# Patient Record
Sex: FEMALE | Race: WHITE | NOT HISPANIC OR LATINO | Employment: FULL TIME | URBAN - METROPOLITAN AREA
[De-identification: names, ages, dates, MRNs, and addresses within clinical notes are randomized per-mention and may not be internally consistent; named-entity substitution may affect disease eponyms.]

---

## 2018-01-10 NOTE — PROGRESS NOTES
Assessment    1  Urinary tract infection (599 0) (N39 0)    Plan  Urinary tract infection    · Ciprofloxacin HCl - 500 MG Oral Tablet; TAKE 1 TABLET TWICE DAILY   · Drink at least 6 glasses of clear liquids a day ; Status:Complete;   Done: [id number]  08:41AM    Chief Complaint    1  Dysuria    History of Present Illness    Grayson Perdomo presents with complaints of sudden onset of constant episodes of moderate dysuria  Episodes started about 2 days ago  Associated symptoms include urgency, frequency, hematuria and suprapubic pain, but no flank pain, no fever, no chills, no nausea and no vomiting  Review of Systems    Constitutional: No fever, no chills, feels well, no tiredness, no recent weight gain or loss  Cardiovascular: no complaints of slow or fast heart rate, no chest pain, no palpitations, no leg claudication or lower extremity edema  Respiratory: no complaints of shortness of breath, no wheezing, no dyspnea on exertion, no orthopnea or PND  Gastrointestinal: no complaints of abdominal pain, no constipation, no nausea or diarrhea, no vomiting, no bloody stools  Genitourinary: as noted in HPI  Active Problems    1  Asymptomatic spider vein (448 1) (I78 1)   2  Varicose veins of leg with pain, right (454 8) (I83 811)    Past Medical History    1  History of Arthritis with psoriasis (696 0) (L40 50)   2  History of Psoriasis (696 1) (L40 9)    The active problems and past medical history were reviewed and updated today  Family History  Mother    1  Family history of psoriasis (V19 4) (Z84 0)   2  Family history of Melanoma  Father    3  Family history of Heart attack    The family history was reviewed and updated today  Social History    · Never a smoker  The social history was reviewed and updated today  Surgical History    1  History of  Section   2  History of Dilation And Curettage    The surgical history was reviewed and updated today  Current Meds   1  No Reported Medications Recorded    The medication list was reviewed and updated today  Allergies    1  No Known Drug Allergies    Observations Made  Pt in no apparent distress  Self palpation of the bladder area elicits mild tenderness  No pain to self palpation of the flanks  Daniel Cyr is under my professional care   She was evaluated by myself via video conference on 10/21/16             Signatures   Electronically signed by : PORSHA Dai ; Oct 21 2016  8:41AM EST                       (Author)

## 2018-01-15 NOTE — MISCELLANEOUS
Message  Return to work or school evisit:   Aly Singleton is under my professional care   She was evaluated by myself via video conference on 10/21/16             Signatures   Electronically signed by : PORSHA Singh ; Oct 21 2016  8:41AM EST                       (Author)

## 2018-08-08 ENCOUNTER — APPOINTMENT (EMERGENCY)
Dept: ULTRASOUND IMAGING | Facility: HOSPITAL | Age: 38
End: 2018-08-08
Payer: COMMERCIAL

## 2018-08-08 ENCOUNTER — HOSPITAL ENCOUNTER (EMERGENCY)
Facility: HOSPITAL | Age: 38
Discharge: HOME | End: 2018-08-08
Attending: EMERGENCY MEDICINE | Admitting: EMERGENCY MEDICINE
Payer: COMMERCIAL

## 2018-08-08 VITALS
RESPIRATION RATE: 18 BRPM | BODY MASS INDEX: 28.59 KG/M2 | DIASTOLIC BLOOD PRESSURE: 67 MMHG | OXYGEN SATURATION: 99 % | WEIGHT: 156.31 LBS | SYSTOLIC BLOOD PRESSURE: 117 MMHG | TEMPERATURE: 98 F | HEART RATE: 82 BPM

## 2018-08-08 DIAGNOSIS — N83.209 OVARIAN CYST: ICD-10-CM

## 2018-08-08 DIAGNOSIS — D21.9 FIBROIDS: ICD-10-CM

## 2018-08-08 DIAGNOSIS — N94.6 DYSMENORRHEA: Primary | ICD-10-CM

## 2018-08-08 LAB
ALBUMIN SERPL BCP-MCNC: 4.1 G/DL (ref 3.5–5)
ALP SERPL-CCNC: 50 U/L (ref 46–116)
ALT SERPL W P-5'-P-CCNC: 43 U/L (ref 12–78)
ANION GAP SERPL CALCULATED.3IONS-SCNC: 9 MMOL/L (ref 4–13)
AST SERPL W P-5'-P-CCNC: 27 U/L (ref 5–45)
BACTERIA UR QL AUTO: ABNORMAL /HPF
BASOPHILS # BLD AUTO: 0.05 THOUSANDS/ΜL (ref 0–0.1)
BASOPHILS NFR BLD AUTO: 0 % (ref 0–1)
BILIRUB SERPL-MCNC: 0.6 MG/DL (ref 0.2–1)
BILIRUB UR QL STRIP: NEGATIVE
BUN SERPL-MCNC: 16 MG/DL (ref 5–25)
CALCIUM SERPL-MCNC: 9 MG/DL (ref 8.3–10.1)
CHLORIDE SERPL-SCNC: 100 MMOL/L (ref 100–108)
CLARITY UR: ABNORMAL
CO2 SERPL-SCNC: 27 MMOL/L (ref 21–32)
COLOR UR: ABNORMAL
CREAT SERPL-MCNC: 0.75 MG/DL (ref 0.6–1.3)
EOSINOPHIL # BLD AUTO: 0.05 THOUSAND/ΜL (ref 0–0.61)
EOSINOPHIL NFR BLD AUTO: 0 % (ref 0–6)
ERYTHROCYTE [DISTWIDTH] IN BLOOD BY AUTOMATED COUNT: 12.8 % (ref 11.6–15.1)
EXT PREG TEST URINE: NEGATIVE
GFR SERPL CREATININE-BSD FRML MDRD: 101 ML/MIN/1.73SQ M
GLUCOSE SERPL-MCNC: 100 MG/DL (ref 65–140)
GLUCOSE UR STRIP-MCNC: NEGATIVE MG/DL
HCT VFR BLD AUTO: 38.9 % (ref 34.8–46.1)
HGB BLD-MCNC: 12.8 G/DL (ref 11.5–15.4)
HGB UR QL STRIP.AUTO: ABNORMAL
IMM GRANULOCYTES # BLD AUTO: 0.07 THOUSAND/UL (ref 0–0.2)
IMM GRANULOCYTES NFR BLD AUTO: 1 % (ref 0–2)
KETONES UR STRIP-MCNC: ABNORMAL MG/DL
LEUKOCYTE ESTERASE UR QL STRIP: ABNORMAL
LIPASE SERPL-CCNC: 179 U/L (ref 73–393)
LYMPHOCYTES # BLD AUTO: 1.28 THOUSANDS/ΜL (ref 0.6–4.47)
LYMPHOCYTES NFR BLD AUTO: 9 % (ref 14–44)
MCH RBC QN AUTO: 30.6 PG (ref 26.8–34.3)
MCHC RBC AUTO-ENTMCNC: 32.9 G/DL (ref 31.4–37.4)
MCV RBC AUTO: 93 FL (ref 82–98)
MONOCYTES # BLD AUTO: 0.79 THOUSAND/ΜL (ref 0.17–1.22)
MONOCYTES NFR BLD AUTO: 6 % (ref 4–12)
NEUTROPHILS # BLD AUTO: 11.55 THOUSANDS/ΜL (ref 1.85–7.62)
NEUTS SEG NFR BLD AUTO: 84 % (ref 43–75)
NITRITE UR QL STRIP: NEGATIVE
NON-SQ EPI CELLS URNS QL MICRO: ABNORMAL /HPF
NRBC BLD AUTO-RTO: 0 /100 WBCS
PH UR STRIP.AUTO: 6.5 [PH] (ref 4.5–8)
PLATELET # BLD AUTO: 284 THOUSANDS/UL (ref 149–390)
PMV BLD AUTO: 10.5 FL (ref 8.9–12.7)
POTASSIUM SERPL-SCNC: 3.9 MMOL/L (ref 3.5–5.3)
PROT SERPL-MCNC: 7.5 G/DL (ref 6.4–8.2)
PROT UR STRIP-MCNC: ABNORMAL MG/DL
RBC # BLD AUTO: 4.18 MILLION/UL (ref 3.81–5.12)
RBC #/AREA URNS AUTO: ABNORMAL /HPF
SODIUM SERPL-SCNC: 136 MMOL/L (ref 136–145)
SP GR UR STRIP.AUTO: 1.02 (ref 1–1.03)
UROBILINOGEN UR QL STRIP.AUTO: 0.2 E.U./DL
WBC # BLD AUTO: 13.79 THOUSAND/UL (ref 4.31–10.16)
WBC #/AREA URNS AUTO: ABNORMAL /HPF

## 2018-08-08 PROCEDURE — 76856 US EXAM PELVIC COMPLETE: CPT

## 2018-08-08 PROCEDURE — 99284 EMERGENCY DEPT VISIT MOD MDM: CPT

## 2018-08-08 PROCEDURE — 83690 ASSAY OF LIPASE: CPT | Performed by: EMERGENCY MEDICINE

## 2018-08-08 PROCEDURE — 80053 COMPREHEN METABOLIC PANEL: CPT | Performed by: EMERGENCY MEDICINE

## 2018-08-08 PROCEDURE — 96361 HYDRATE IV INFUSION ADD-ON: CPT

## 2018-08-08 PROCEDURE — 76830 TRANSVAGINAL US NON-OB: CPT

## 2018-08-08 PROCEDURE — 36415 COLL VENOUS BLD VENIPUNCTURE: CPT | Performed by: EMERGENCY MEDICINE

## 2018-08-08 PROCEDURE — 96375 TX/PRO/DX INJ NEW DRUG ADDON: CPT

## 2018-08-08 PROCEDURE — 81001 URINALYSIS AUTO W/SCOPE: CPT | Performed by: EMERGENCY MEDICINE

## 2018-08-08 PROCEDURE — 85025 COMPLETE CBC W/AUTO DIFF WBC: CPT | Performed by: EMERGENCY MEDICINE

## 2018-08-08 PROCEDURE — 96374 THER/PROPH/DIAG INJ IV PUSH: CPT

## 2018-08-08 PROCEDURE — 81025 URINE PREGNANCY TEST: CPT | Performed by: EMERGENCY MEDICINE

## 2018-08-08 RX ORDER — ONDANSETRON 4 MG/1
4 TABLET, ORALLY DISINTEGRATING ORAL EVERY 6 HOURS PRN
Qty: 10 TABLET | Refills: 0 | Status: SHIPPED | OUTPATIENT
Start: 2018-08-08 | End: 2018-10-04 | Stop reason: ALTCHOICE

## 2018-08-08 RX ORDER — NAPROXEN 500 MG/1
500 TABLET ORAL EVERY 12 HOURS PRN
Qty: 20 TABLET | Refills: 0 | Status: SHIPPED | OUTPATIENT
Start: 2018-08-08 | End: 2018-10-04 | Stop reason: ALTCHOICE

## 2018-08-08 RX ORDER — ONDANSETRON 2 MG/ML
4 INJECTION INTRAMUSCULAR; INTRAVENOUS ONCE
Status: COMPLETED | OUTPATIENT
Start: 2018-08-08 | End: 2018-08-08

## 2018-08-08 RX ORDER — KETOROLAC TROMETHAMINE 30 MG/ML
15 INJECTION, SOLUTION INTRAMUSCULAR; INTRAVENOUS ONCE
Status: COMPLETED | OUTPATIENT
Start: 2018-08-08 | End: 2018-08-08

## 2018-08-08 RX ADMIN — SODIUM CHLORIDE 1000 ML: 0.9 INJECTION, SOLUTION INTRAVENOUS at 15:20

## 2018-08-08 RX ADMIN — KETOROLAC TROMETHAMINE 15 MG: 30 INJECTION, SOLUTION INTRAMUSCULAR at 15:21

## 2018-08-08 RX ADMIN — ONDANSETRON 4 MG: 2 INJECTION INTRAMUSCULAR; INTRAVENOUS at 15:21

## 2018-08-08 NOTE — ED PROVIDER NOTES
History  Chief Complaint   Patient presents with    Abdominal Pain     Pt states that she started menstrating this morning and is now having cramping and increased pain in her lower abdomin  Pt states the pain is not her normal menstrational pain  Patient is a 26-year-old female with past medical and surgical history of psoriatic arthritis currently controlled off medication as well as 2 prior C-sections, presents to the ED complaining of severe menstrual cramps  Patient reports that she started her normal  Last night and today woke up with severe pelvic cramping that is much worse than normal menstrual cramps  She reports her  Seems to be normal and not heavier than usual and she denies any passage of blood clots  She states she usually gets mild cramps that are usually resolved with Midol however she had took Midol at around 11:00 a m  and has not had any relief  She states she has been doubled over in pain which has been causing her to feel dizzy and somewhat nauseous  Pain is all across her lower abdomen, worse on the left side  She denies any other complaints at this time and rest of review of systems is negative  No fever, chills, headache, dizziness currently, URI symptoms or cough, hemoptysis, chest pain, palpitations, dyspnea, neck or back pain, skin rash or color change, upper abdominal pain, vomiting, diarrhea, constipation, blood per rectum or melena, dysuria, change in urinary frequency, hematuria, vaginal discharge, dyspareunia, extremity weakness or paresthesia or other focal neurologic deficits  Denies any known history of fibroids, ovarian cysts or sexually transmitted infections  She is  and her  is status post vasectomy so she does not believe she could be pregnant          History provided by:  Patient   used: No    Abdominal Pain   Associated symptoms: vaginal bleeding    Associated symptoms: no chest pain, no chills, no constipation, no cough, no diarrhea, no dysuria, no fever, no hematuria, no nausea, no shortness of breath, no sore throat, no vaginal discharge and no vomiting        None       Past Medical History:   Diagnosis Date    Arthritis     Psoriasis        Past Surgical History:   Procedure Laterality Date     SECTION         History reviewed  No pertinent family history  I have reviewed and agree with the history as documented  Social History   Substance Use Topics    Smoking status: Never Smoker    Smokeless tobacco: Never Used    Alcohol use Yes      Comment: occassionally        Review of Systems   Constitutional: Negative for chills and fever  HENT: Negative for congestion, ear pain, rhinorrhea and sore throat  Eyes: Negative for pain and visual disturbance  Respiratory: Negative for cough, chest tightness, shortness of breath and wheezing  Cardiovascular: Negative for chest pain and palpitations  Gastrointestinal: Positive for abdominal pain  Negative for abdominal distention, blood in stool, constipation, diarrhea, nausea and vomiting  Genitourinary: Positive for menstrual problem, pelvic pain and vaginal bleeding  Negative for dyspareunia, dysuria, flank pain, frequency, hematuria and vaginal discharge  Musculoskeletal: Negative for back pain and neck pain  Skin: Negative for color change and rash  Allergic/Immunologic: Negative for immunocompromised state  Neurological: Negative for dizziness, syncope, weakness, light-headedness, numbness and headaches  Hematological: Negative for adenopathy  Does not bruise/bleed easily  Psychiatric/Behavioral: Negative for confusion and decreased concentration  All other systems reviewed and are negative  Physical Exam  Physical Exam   Constitutional: She is oriented to person, place, and time  She appears well-developed and well-nourished  No distress  HENT:   Head: Normocephalic and atraumatic     Mouth/Throat: Oropharynx is clear and moist  Eyes: Conjunctivae and EOM are normal  Pupils are equal, round, and reactive to light  Neck: Normal range of motion  Neck supple  No JVD present  Cardiovascular: Normal rate, regular rhythm, normal heart sounds and intact distal pulses  Exam reveals no gallop and no friction rub  No murmur heard  Pulmonary/Chest: Effort normal and breath sounds normal  No respiratory distress  She has no wheezes  She has no rales  Abdominal: Soft  Bowel sounds are normal  She exhibits no distension  There is tenderness  There is no rebound and no guarding  Diffuse lower abdominal/pelvic tenderness, more significant in the left lower quadrant  Musculoskeletal: Normal range of motion  She exhibits no edema or tenderness  Neurological: She is alert and oriented to person, place, and time  No gross motor or sensory deficits  Skin: Skin is warm and dry  No rash noted  She is not diaphoretic  No erythema  No pallor  Psychiatric: She has a normal mood and affect  Her behavior is normal    Nursing note and vitals reviewed        Vital Signs  ED Triage Vitals   Temperature Pulse Respirations Blood Pressure SpO2   08/08/18 1449 08/08/18 1449 08/08/18 1449 08/08/18 1449 08/08/18 1449   98 °F (36 7 °C) 80 18 138/80 100 %      Temp Source Heart Rate Source Patient Position - Orthostatic VS BP Location FiO2 (%)   08/08/18 1449 08/08/18 1449 08/08/18 1449 08/08/18 1449 --   Oral Monitor Lying Right arm       Pain Score       08/08/18 1521       5         Vitals:    08/08/18 1449 08/08/18 1451 08/08/18 1704   BP: 138/80  117/67   BP Location: Right arm  Right arm   Pulse: 80  82   Resp: 18  18   Temp: 98 °F (36 7 °C)     TempSrc: Oral     SpO2: 100%  99%   Weight:  70 9 kg (156 lb 4 9 oz)      Visual Acuity      ED Medications  Medications   sodium chloride 0 9 % bolus 1,000 mL (0 mL Intravenous Stopped 8/8/18 1704)   ondansetron (ZOFRAN) injection 4 mg (4 mg Intravenous Given 8/8/18 1521)   ketorolac (TORADOL) injection 15 mg (15 mg Intravenous Given 8/8/18 1521)       Diagnostic Studies  Results Reviewed     Procedure Component Value Units Date/Time    Comprehensive metabolic panel [[id number]] Collected:  08/08/18 1520    Lab Status:  Final result Specimen:  Blood from Arm, Right Updated:  08/08/18 1548     Sodium 136 mmol/L      Potassium 3 9 mmol/L      Chloride 100 mmol/L      CO2 27 mmol/L      Anion Gap 9 mmol/L      BUN 16 mg/dL      Creatinine 0 75 mg/dL      Glucose 100 mg/dL      Calcium 9 0 mg/dL      AST 27 U/L      ALT 43 U/L      Alkaline Phosphatase 50 U/L      Total Protein 7 5 g/dL      Albumin 4 1 g/dL      Total Bilirubin 0 60 mg/dL      eGFR 101 ml/min/1 73sq m     Narrative:         National Kidney Disease Education Program recommendations are as follows:  GFR calculation is accurate only with a steady state creatinine  Chronic Kidney disease less than 60 ml/min/1 73 sq  meters  Kidney failure less than 15 ml/min/1 73 sq  meters      Lipase [[id number]]  (Normal) Collected:  08/08/18 1520    Lab Status:  Final result Specimen:  Blood from Arm, Right Updated:  08/08/18 1548     Lipase 179 u/L     Urine Microscopic [[id number]]  (Abnormal) Collected:  08/08/18 1520    Lab Status:  Final result Specimen:  Urine from Urine, Clean Catch Updated:  08/08/18 1546     RBC, UA Innumerable (A) /hpf      WBC, UA       Field obscured, unable to enumerate (A)     /hpf     Epithelial Cells       Field obscured, unable to enumerate (A)     /hpf     Bacteria, UA       Field obscured, unable to enumerate (A)     /hpf    UA w Reflex to Microscopic [[id number]]  (Abnormal) Collected:  08/08/18 1520    Lab Status:  Final result Specimen:  Urine from Urine, Clean Catch Updated:  08/08/18 1542     Color, UA Red     Clarity, UA Turbid     Specific Gravity, UA 1 025     pH, UA 6 5     Leukocytes, UA Trace (A)     Nitrite, UA Negative     Protein,  (2+) (A) mg/dl      Glucose, UA Negative mg/dl      Ketones, UA 15 (1+) (A) mg/dl Urobilinogen, UA 0 2 E U /dl      Bilirubin, UA Negative     Blood, UA Large (A)    CBC and differential [[id number]]  (Abnormal) Collected:  08/08/18 1520    Lab Status:  Final result Specimen:  Blood from Arm, Right Updated:  08/08/18 1533     WBC 13 79 (H) Thousand/uL      RBC 4 18 Million/uL      Hemoglobin 12 8 g/dL      Hematocrit 38 9 %      MCV 93 fL      MCH 30 6 pg      MCHC 32 9 g/dL      RDW 12 8 %      MPV 10 5 fL      Platelets 597 Thousands/uL      nRBC 0 /100 WBCs      Neutrophils Relative 84 (H) %      Immat GRANS % 1 %      Lymphocytes Relative 9 (L) %      Monocytes Relative 6 %      Eosinophils Relative 0 %      Basophils Relative 0 %      Neutrophils Absolute 11 55 (H) Thousands/µL      Immature Grans Absolute 0 07 Thousand/uL      Lymphocytes Absolute 1 28 Thousands/µL      Monocytes Absolute 0 79 Thousand/µL      Eosinophils Absolute 0 05 Thousand/µL      Basophils Absolute 0 05 Thousands/µL     POCT pregnancy, urine [[id number]]  (Normal) Resulted:  08/08/18 1520    Lab Status:  Final result Updated:  08/08/18 1528     EXT PREG TEST UR (Ref: Negative) negative                 US pelvis complete w transvaginal   Final Result by Yaz Hernandez MD (08/08 1624)   Addendum 1 of 1 by Yaz Hernandez MD (08/08 1718)   ADDENDUM:      Repeat transvaginal exam was performed and 2 5 x 2 3 x 2 2 cm and 3 x 2 5    x 2 6 cm isoechoic masses identified in the enlarged right ovary likely    representing endometriomas or hemorrhagic cysts  Follow-up pelvic    ultrasound recommended in 6 weeks  Final          1  Enlarged right ovary likely containing a corpus luteum  However, follow-up pelvic ultrasound in 6 weeks is recommended to ensure resolution and assess progression  2   Uterine leiomyomata                        Workstation performed: [id number]                    Procedures  Procedures       Phone Contacts  ED Phone Contact    ED Course  ED Course as of Aug 08 1727   Wed Aug 08, 2018   1623 Patient reassessed and pain overall improved  1726 Updated patient about ultrasound findings as well as need for 6 week follow-up  Advised patient to follow up with her OBGYN to discuss the ultrasound findings as well as for her worsening menstrual pain and for repeat follow-up ultrasound in 6 weeks  Discussed ED return parameters  MDM  Number of Diagnoses or Management Options  Diagnosis management comments: 39-year-old female presents with vaginal bleeding and pelvic pain in conjunction with menstruation  Most likely this is dysmenorrhea/severe menstrual cramping  Differential also includes ovarian cyst, cyst rupture, ovarian torsion  Less likely PID  Low suspicion for GI or surgical pathology such as appendicitis or diverticulitis  Will evaluate with abdominal labs and pelvic ultrasound  Will give IV fluid bolus, Zofran and Toradol  Amount and/or Complexity of Data Reviewed  Clinical lab tests: ordered and reviewed  Tests in the radiology section of CPT®: ordered and reviewed  Independent visualization of images, tracings, or specimens: yes      CritCare Time    Disposition  Final diagnoses:   Dysmenorrhea   Ovarian cyst   Fibroids     Time reflects when diagnosis was documented in both MDM as applicable and the Disposition within this note     Time User Action Codes Description Comment    8/8/2018  5:24 PM Alvino LE Add [N94 6] Dysmenorrhea     8/8/2018  5:24 PM Hunt Guard Add [P88 460,  N83 202] Bilateral ovarian cysts     8/8/2018  5:24 PM Hunt Guard Remove [W67 027,  N83 202] Bilateral ovarian cysts     8/8/2018  5:24 PM Alvino Burrell E Add [Q73 417] Ovarian cyst     8/8/2018  5:24 PM Alvino LE Add [D25 9] Fibroids       ED Disposition     ED Disposition Condition Comment    Discharge  Tiburcio Ramirez discharge to home/self care      Condition at discharge: Stable        Follow-up Information     Follow up With Specialties Details Why Contact Info Additional Information    [facility], DO Family Medicine Schedule an appointment as soon as possible for a visit  [address]  [phone]       Ob/Gyn  Schedule an appointment as soon as possible for a visit For follow-up ultrasound in 6 weeks      [facility] Emergency Medicine Go to If symptoms worsen [address] [facility] [address]  [phone] [place] [facility], [address], [place], [address]          Patient's Medications   Discharge Prescriptions    NAPROXEN (NAPROSYN) 500 MG TABLET    Take 1 tablet (500 mg total) by mouth every 12 (twelve) hours as needed for mild pain or moderate pain       Start Date: 8/8/2018  End Date: --       Order Dose: 500 mg       Quantity: 20 tablet    Refills: 0    ONDANSETRON (ZOFRAN-ODT) 4 MG DISINTEGRATING TABLET    Take 1 tablet (4 mg total) by mouth every 6 (six) hours as needed for nausea or vomiting       Start Date: 8/8/2018  End Date: --       Order Dose: 4 mg       Quantity: 10 tablet    Refills: 0     No discharge procedures on file      ED Provider  Electronically Signed by           Sergio Deras DO  08/08/18 2031

## 2018-08-08 NOTE — ED NOTES
US at [facility] [address], [facility]  08/08/18 0543

## 2018-08-08 NOTE — DISCHARGE INSTRUCTIONS
Dysmenorrhea   WHAT YOU NEED TO KNOW:   Dysmenorrhea is painful menstrual cramps at or around the time of your monthly period  DISCHARGE INSTRUCTIONS:   Medicines: You may need any of the following:  · NSAIDs  help decrease swelling, pain, and fever  This medicine is available with or without a doctor's order  NSAIDs can cause stomach bleeding or kidney problems in certain people  If you take blood thinner medicine, always ask your healthcare provider if NSAIDs are safe for you  Always read the medicine label and follow directions  · Birth control medicine  may help decrease your pain  This medicine may be birth control pills or an IUD that does not contain copper  · Take your medicine as directed  Contact your healthcare provider if you think your medicine is not helping or if you have side effects  Tell him if you are allergic to any medicine  Keep a list of the medicines, vitamins, and herbs you take  Include the amounts, and when and why you take them  Bring the list or the pill bottles to follow-up visits  Carry your medicine list with you in case of an emergency  Eat low-fat foods: Increase the amount of vegetables and raw seeds you eat  Ask your healthcare provider if you should take vitamin B or magnesium supplements  These will help decrease your pain  Do not eat dairy products or eggs  Apply heat:  Apply heat on your lower abdomen for 20 to 30 minutes every 2 hours for as many days as directed  Heat helps decrease pain and muscle spasms  Manage your stress:  Stress can make your symptoms worse  Try relaxation exercises, such as deep breathing  Exercise regularly:  Ask your healthcare provider about the best exercise plan for you  Exercise can help decrease pain  Keep a record of your pain:  Write down when your pain and periods start and stop  Bring the record with you to your follow-up visits  Do not smoke:  Avoid others who smoke  If you smoke, it is never too late to quit   Smoking can increase your risk for dysmenorrhea  Ask your healthcare provider for information if you need help quitting  Follow up with your healthcare provider or OB-GYN as directed:  Write down your questions so you remember to ask them during your visits  Contact your healthcare provider or OB-GYN if:   · You have anxiety or feel depressed  · Your periods are early, late, or more painful than usual     · You have questions or concerns about your condition or care  Return to the emergency department if:   · You have severe pain  · You have heavy vaginal bleeding and you feel faint  · You have sudden chest pain and trouble breathing  © 2017 [address]  Information is for End User's use only and may not be sold, redistributed or otherwise used for commercial purposes  All illustrations and images included in CareNotes® are the copyrighted property of A D A M , Inc  or Daren Archuleta  The above information is an  only  It is not intended as medical advice for individual conditions or treatments  Talk to your doctor, nurse or pharmacist before following any medical regimen to see if it is safe and effective for you  Ovarian Cyst   WHAT YOU NEED TO KNOW:   An ovarian cyst is a sac that grows on an ovary  This sac usually contains fluid, but may sometimes have blood or tissue in it  Most ovarian cysts are harmless and go away without treatment in a few months  Some cysts can grow large, cause pain, or break open  DISCHARGE INSTRUCTIONS:   Call 911 for any of the following:   · You are too weak or dizzy to stand up  Seek care immediately if:   · You have severe abdominal pain  The pain may be sharp and sudden  · You have a fever  Contact your healthcare provider if:   · Your periods are early, late, or more painful than usual     · You have bleeding from your vagina that is not your period  · You have abdominal pain all the time      · Your abdomen is swollen  · You have feelings of fullness, pressure, or discomfort in your abdomen  · You have trouble urinating or emptying your bladder completely  · You have pain during sex  · You are losing weight without trying  · You have questions or concerns about your condition or care  Medicines: You may need any of the following:  · NSAIDs , such as ibuprofen, help decrease swelling, pain, and fever  This medicine is available with or without a doctor's order  NSAIDs can cause stomach bleeding or kidney problems in certain people  If you take blood thinner medicine, always ask if NSAIDs are safe for you  Always read the medicine label and follow directions  Do not give these medicines to children under 10months of age without direction from your child's healthcare provider  · Birth control pills  may help to control your periods, prevent cysts, or cause them to shrink  · Take your medicine as directed  Contact your healthcare provider if you think your medicine is not helping or if you have side effects  Tell him or her if you are allergic to any medicine  Keep a list of the medicines, vitamins, and herbs you take  Include the amounts, and when and why you take them  Bring the list or the pill bottles to follow-up visits  Carry your medicine list with you in case of an emergency  Follow up with your healthcare provider as directed:  Write down your questions so you remember to ask them during your visits  Apply heat to decrease pain and cramping:  Sit in a warm bath, or place a heating pad (turned on low) or a hot water bottle on your abdomen  Do this for 15 to 20 minutes every hour for as many days as directed  © 2017 [address] [employer]  Information is for End User's use only and may not be sold, redistributed or otherwise used for commercial purposes  All illustrations and images included in CareNotes® are the copyrighted property of [email or web address]  or Daren Archuleta    The above information is an  only  It is not intended as medical advice for individual conditions or treatments  Talk to your doctor, nurse or pharmacist before following any medical regimen to see if it is safe and effective for you  Uterine Fibroids   WHAT YOU NEED TO KNOW:   Uterine fibroids are growths found inside your uterus (womb)  Uterine fibroids also may be called tumors (lumps) or leiomyomas  Uterine fibroids often appear in groups, or you may have only one  They can be small or large, and they can grow in size  They are almost always benign (not cancer) and likely will not spread to other parts of your body  DISCHARGE INSTRUCTIONS:   Medicines:   · Pain medicine: You may be given medicine to take away or decrease pain  Do not wait until the pain is severe before you take your medicine  · Hormone medicine: This medicine changes the level of certain hormones and may then help shrink your fibroids  · Contraceptives: These medicines help prevent pregnancy  They also may help shrink your fibroids  · Take your medicine as directed:  Call your healthcare provider if you think your medicine is not helping or if you have side effects  Tell him if you are taking any vitamins, herbs, or other medicines  Keep a list of the medicines you take  Include the amounts, and when and why you take them  Bring the list or the pill bottles to follow-up visits  Follow up with your healthcare provider as directed:  Write down your questions so you remember to ask them during your visits  Avoid heavy lifting: You will need to avoid lifting heavy objects for a period of time after surgery  This helps prevent injury to your surgery wound  Ask your healthcare provider when you may return to your normal daily activities  Avoid pregnancy:  You will need to avoid pregnancy until you have healed after treatment for your fibroids  Ask your healthcare provider when it is safe to become pregnant    Care for your wound:  Ask your healthcare provider how to care for your surgery wound  Contact your healthcare provider if:   · You feel weak and are more tired than usual      · You do not feel like your bladder is empty after you urinate  You also may urinate small amounts more often  · You have new or worse hot flashes  · You have any questions about your condition or care  Seek immediate care or call 911 if:   · Your heart begins to race, and you feel faint  · You have increased vaginal bleeding, pelvic pain, or pelvic pressure  · You have a fever  · Your leg feels warm, tender, and painful  It may look swollen and red  · You cough up blood  · You feel lightheaded, short of breath, and have chest pain  You cough up blood  · You feel lightheaded, short of breath, and have chest pain  © 2017 [address] [employer]  Information is for End User's use only and may not be sold, redistributed or otherwise used for commercial purposes  All illustrations and images included in CareNotes® are the copyrighted property of A D A M , Inc  or [employer]  The above information is an  only  It is not intended as medical advice for individual conditions or treatments  Talk to your doctor, nurse or pharmacist before following any medical regimen to see if it is safe and effective for you  ADDENDUM:     Repeat transvaginal exam was performed and 2 5 x 2 3 x 2 2 cm and 3 x 2 5 x 2 6 cm isoechoic masses identified in the enlarged right ovary likely representing endometriomas or hemorrhagic cysts  Follow-up pelvic ultrasound recommended in 6 weeks  Signed by Pili Wang MD on 8/8/2018  5:18 PM   Narrative     PELVIC ULTRASOUND, COMPLETE    INDICATION:  44 years old  [place]  acute severe pelvic pain, worse then menstrual cramps, r/o ovarian cyst rupture or torsion      COMPARISON: None    TECHNIQUE:   Transabdominal pelvic ultrasound was performed in sagittal and transverse planes with a curvilinear transducer   Additional transvaginal imaging was performed to better evaluate the endometrium and ovaries   Imaging included volumetric   sweeps as well as traditional still imaging technique  FINDINGS:    UTERUS:  The uterus is anteverted in position, measuring 8 8 x 3 7 cm  Posterior mid uterine intramural 0 7 cm mass/fibroid   Right-sided subserosal uterine 1 8 x 1 6 cm mass/fibroid   Left-sided uterine subserosal 1 x 1 5 cm mass/fibroid  Heterogeneous uterine echotexture  The cervix shows no suspicious abnormality  ENDOMETRIUM:    Normal caliber of 6 mm  Homogenous and normal in appearance  OVARIES/ADNEXA:  Right ovary is enlarged, 44 mL  Suspect right ovarian corpus luteum  Doppler flow within normal limits  Left ovary is normal size, 9 mL  No suspicious left ovarian abnormality  Doppler flow within normal limits  No suspicious adnexal mass or loculated collections  There is small amount of simple free fluid in the pelvis, likely physiologic in this premenopausal female  Impression          1   Enlarged right ovary likely containing a corpus luteum   However, follow-up pelvic ultrasound in 6 weeks is recommended to ensure resolution and assess progression  2   Uterine leiomyomata

## 2018-09-10 ENCOUNTER — OFFICE VISIT (OUTPATIENT)
Dept: OBGYN CLINIC | Age: 38
End: 2018-09-10
Payer: COMMERCIAL

## 2018-09-10 VITALS
HEIGHT: 62 IN | RESPIRATION RATE: 14 BRPM | WEIGHT: 155 LBS | DIASTOLIC BLOOD PRESSURE: 80 MMHG | BODY MASS INDEX: 28.52 KG/M2 | SYSTOLIC BLOOD PRESSURE: 114 MMHG

## 2018-09-10 DIAGNOSIS — N83.201 RIGHT OVARIAN CYST: ICD-10-CM

## 2018-09-10 DIAGNOSIS — N94.6 DYSMENORRHEA: Primary | ICD-10-CM

## 2018-09-10 DIAGNOSIS — Z12.31 ENCOUNTER FOR SCREENING MAMMOGRAM FOR MALIGNANT NEOPLASM OF BREAST: ICD-10-CM

## 2018-09-10 PROBLEM — D25.2 INTRAMURAL AND SUBSEROUS LEIOMYOMA OF UTERUS: Status: ACTIVE | Noted: 2018-09-10

## 2018-09-10 PROBLEM — D25.1 INTRAMURAL AND SUBSEROUS LEIOMYOMA OF UTERUS: Status: ACTIVE | Noted: 2018-09-10

## 2018-09-10 PROCEDURE — 99202 OFFICE O/P NEW SF 15 MIN: CPT | Performed by: OBSTETRICS & GYNECOLOGY

## 2018-09-10 RX ORDER — LEVONORGESTREL AND ETHINYL ESTRADIOL 0.1-0.02MG
1 KIT ORAL DAILY
Qty: 84 TABLET | Refills: 3 | Status: SHIPPED | OUTPATIENT
Start: 2018-09-10 | End: 2019-04-02 | Stop reason: ALTCHOICE

## 2018-09-10 NOTE — PROGRESS NOTES
Assessment/Plan:    Dysmenorrhea  Worsening dysmenorrhea over the last 6 months  Unclear cause- ovarian cyst rupture/endometriosis  Options- observation, oc, depoprovera, surgery    At this time will start OC for treatment of possible causes of dysmenorrhea  Follow up 3 months pill check and annual        Diagnoses and all orders for this visit:    Encounter for screening mammogram for malignant neoplasm of breast  -     Mammo screening bilateral w cad; Future    Right ovarian cyst  -     US pelvis complete w transvaginal; Future  -     levonorgestrel-ethinyl estradiol (AVIANE,ALESSE,LESSINA) 0 1-20 MG-MCG per tablet; Take 1 tablet by mouth daily    Dysmenorrhea  -     levonorgestrel-ethinyl estradiol (AVIANE,ALESSE,LESSINA) 0 1-20 MG-MCG per tablet; Take 1 tablet by mouth daily          Subjective:      Patient ID: Kerrie Fabian is a 43 y o  female  Patient is here for a follow up appointment   Patient was seen in the ER for Ovarian cyst Fibroids  43year old  with visit to ER one month ago for severe pelvic pain  Lasted approximately 24 hours  Felt weak, nauseated  Went to ER  Prior to this she had a similar episode but not as severe three months ago lasting 2-4 hours  Both episodes occurred a few days before the onset of menses  Menses occur regularly and last 5-7 days  With minimal pain  (maybe a midol)    Sexually active,  has vasectomy    Sons - 13, 6   Via csection  Has seen GYN in the last 1-2 years      In ER - ultrasound reviewed  Small fibroids/enlarged ovary    Gynecologic Exam   The patient's primary symptoms include pelvic pain  The patient's pertinent negatives include no genital itching, genital lesions, genital odor, genital rash, missed menses, vaginal bleeding or vaginal discharge  The current episode started more than 1 month ago  The problem occurs intermittently  The problem has been gradually worsening  The pain is severe  The problem affects both sides   She is not pregnant  Associated symptoms include anorexia and nausea  Pertinent negatives include no abdominal pain, chills, constipation, diarrhea, fever, painful intercourse, sore throat, urgency or vomiting  The symptoms are aggravated by activity  She has tried NSAIDs and heating pads for the symptoms  The treatment provided no relief  She is sexually active  She uses vasectomy for contraception  Her menstrual history has been regular  Her past medical history is significant for a  section  The following portions of the patient's history were reviewed and updated as appropriate:   She  has a past medical history of Arthritis and Psoriasis  She   Patient Active Problem List    Diagnosis Date Noted    Intramural and subserous leiomyoma of uterus 09/10/2018    Right ovarian cyst 09/10/2018    Dysmenorrhea 09/10/2018     She  has a past surgical history that includes  section  Her family history is not on file  She  reports that she has never smoked  She has never used smokeless tobacco  She reports that she drinks alcohol  She reports that she does not use drugs  Current Outpatient Prescriptions   Medication Sig Dispense Refill    levonorgestrel-ethinyl estradiol (AVIANE,ALESSE,LESSINA) 0 1-20 MG-MCG per tablet Take 1 tablet by mouth daily 84 tablet 3    naproxen (NAPROSYN) 500 mg tablet Take 1 tablet (500 mg total) by mouth every 12 (twelve) hours as needed for mild pain or moderate pain (Patient not taking: Reported on 9/10/2018 ) 20 tablet 0    ondansetron (ZOFRAN-ODT) 4 mg disintegrating tablet Take 1 tablet (4 mg total) by mouth every 6 (six) hours as needed for nausea or vomiting (Patient not taking: Reported on 9/10/2018 ) 10 tablet 0     No current facility-administered medications for this visit        Current Outpatient Prescriptions on File Prior to Visit   Medication Sig    naproxen (NAPROSYN) 500 mg tablet Take 1 tablet (500 mg total) by mouth every 12 (twelve) hours as needed for mild pain or moderate pain (Patient not taking: Reported on 9/10/2018 )    ondansetron (ZOFRAN-ODT) 4 mg disintegrating tablet Take 1 tablet (4 mg total) by mouth every 6 (six) hours as needed for nausea or vomiting (Patient not taking: Reported on 9/10/2018 )     No current facility-administered medications on file prior to visit  She has No Known Allergies       Review of Systems   Constitutional: Negative for activity change, appetite change, chills, fatigue and fever  HENT: Negative for rhinorrhea, sneezing and sore throat  Eyes: Negative for visual disturbance  Respiratory: Negative for cough, shortness of breath and wheezing  Cardiovascular: Negative for chest pain, palpitations and leg swelling  Gastrointestinal: Positive for anorexia and nausea  Negative for abdominal distention, abdominal pain, constipation, diarrhea and vomiting  Genitourinary: Positive for pelvic pain  Negative for difficulty urinating, missed menses, urgency and vaginal discharge  Neurological: Negative for syncope and light-headedness  Objective:      /80 (BP Location: Left arm, Patient Position: Sitting, Cuff Size: Standard)   Resp 14   Ht 5' 2" (1 575 m)   Wt 70 3 kg (155 lb)   LMP 09/05/2018 (Exact Date)   BMI 28 35 kg/m²          Physical Exam   Abdominal: Soft  Bowel sounds are normal  She exhibits no distension and no mass  There is no tenderness  There is no rebound and no guarding  Genitourinary: There is no rash, tenderness, lesion or injury on the right labia  There is no rash, tenderness or lesion on the left labia  Uterus is not deviated, not enlarged, not fixed and not tender  Cervix exhibits no motion tenderness, no discharge and no friability  Right adnexum displays no mass, no tenderness and no fullness  Left adnexum displays no mass, no tenderness and no fullness  No erythema or tenderness in the vagina  No signs of injury around the vagina  No vaginal discharge found  Ultrasound: reviewed personally by me  FINDINGS:     UTERUS:  The uterus is anteverted in position, measuring 8 8 x 3 7 cm  Posterior mid uterine intramural 0 7 cm mass/fibroid  Right-sided subserosal uterine 1 8 x 1 6 cm mass/fibroid  Left-sided uterine subserosal 1 x 1 5 cm mass/fibroid  Heterogeneous uterine echotexture  The cervix shows no suspicious abnormality      ENDOMETRIUM:    Normal caliber of 6 mm  Homogenous and normal in appearance      OVARIES/ADNEXA:  Right ovary is enlarged, 44 mL  Suspect right ovarian corpus luteum  Doppler flow within normal limits      Left ovary is normal size, 9 mL  No suspicious left ovarian abnormality  Doppler flow within normal limits      No suspicious adnexal mass or loculated collections  There is small amount of simple free fluid in the pelvis, likely physiologic in this premenopausal female      IMPRESSION:        1   Enlarged right ovary likely containing a corpus luteum  However, follow-up pelvic ultrasound in 6 weeks is recommended to ensure resolution and assess progression  2   Uterine leiomyomata

## 2018-09-10 NOTE — ASSESSMENT & PLAN NOTE
Worsening dysmenorrhea over the last 6 months  Unclear cause- ovarian cyst rupture/endometriosis  Options- observation, oc, depoprovera, surgery    At this time will start OC for treatment of possible causes of dysmenorrhea    Follow up 3 months pill check and annual

## 2018-09-10 NOTE — PATIENT INSTRUCTIONS
Dysmenorrhea   WHAT YOU NEED TO KNOW:   What is dysmenorrhea? Dysmenorrhea is painful menstrual cramps at or around the time of your monthly period  What causes dysmenorrhea? Your body normally produces chemicals each month to help your uterus contract  When too many of these chemicals are made, your uterus contracts too much and causes pain  Dysmenorrhea may also be caused by any of the following:  · Abnormal structure of your uterus or vagina    · A narrow cervix    · Growth in or on your uterus or ovaries    · Medical conditions, such as pelvic inflammatory disease, endometriosis, or uterine fibroids    · A copper intrauterine device (IUD)  What increases my risk for dysmenorrhea? · Never been pregnant    · Obesity    · Smoking    · Family history of painful menstrual cramps    · Pelvic infection    · Longer monthly period cycle    · Medical conditions, such as a sexually transmitted infection or endometriosis  What are the signs and symptoms of dysmenorrhea? · Mild to severe pain    · Cramping pain in lower abdomen or low back    · Bloating    · Headache    · Diarrhea  How is dysmenorrhea diagnosed? Your healthcare provider can usually diagnose dysmenorrhea by your signs and symptoms  Tell him when your symptoms started and if you have pain between your monthly periods  He may ask if anything relieves your pain, such as heat or medicine  Tell your healthcare provider if you are sexually active or have ever been pregnant  You may need any of the following:  · A blood test  will check for pregnancy  · A pelvic exam  may be needed to check the size and shape of your uterus and ovaries  Your healthcare provider gently inserts a warmed speculum into your vagina  A speculum is a tool that opens your vagina to show your cervix  · A cervical culture  may be needed to check for infection  Your healthcare provider will use a swab to collect a sample of cells from your cervix   This will be sent to a lab for tests     · An ultrasound  will show abnormal structure of your reproductive organs  Sound waves are used to show pictures on a monitor  How is dysmenorrhea treated? Dysmenorrhea can be controlled with lifestyle changes and medicines  It usually improves with age and pregnancy  · Medicines:      ¨ NSAIDs  help decrease swelling and pain or fever  This medicine is available with or without a doctor's order  NSAIDs can cause stomach bleeding or kidney problems in certain people  If you take blood thinner medicine, always ask your healthcare provider if NSAIDs are safe for you  Always read the medicine label and follow directions  ¨ Birth control medicine  may help decrease your pain  This medicine may be birth control pills or an IUD that does not contain copper  · Transcutaneous electric nerve stimulation  (TENS), is a device used to stimulate your nerves and decrease pain  Ask your healthcare provider for more information about TENS  How can I manage my symptoms? · Eat low-fat foods  Increase the amount of vegetables and raw seeds you eat  Ask your healthcare provider if you should take vitamin B or magnesium supplements  These will help decrease your pain  Do not eat dairy products or eggs  · Apply heat  on your lower abdomen for 20 to 30 minutes every 2 hours for as many days as directed  Heat helps decrease pain and muscle spasms  · Manage your stress  Stress can make your symptoms worse  Try relaxation exercises, such as deep breathing  · Exercise regularly  Ask your healthcare provider about the best exercise plan for you  Exercise can help decrease pain  · Keep a record of your pain  Write down when your pain and periods start and stop  Bring the record with you to your follow-up visits  · Do not smoke  Avoid others who smoke  If you smoke, it is never too late to quit  Smoking can increase your risk for dysmenorrhea   Ask your healthcare provider for information if you need help quitting  When should I contact my healthcare provider? · You have anxiety or feel depressed  · Your periods are early, late, or more painful than usual     · You have questions or concerns about your condition or care  When should I seek immediate care or call 911? · You have severe pain  · You have heavy vaginal bleeding and you feel faint  · You have sudden chest pain and trouble breathing  CARE AGREEMENT:   You have the right to help plan your care  Learn about your health condition and how it may be treated  Discuss treatment options with your caregivers to decide what care you want to receive  You always have the right to refuse treatment  The above information is an  only  It is not intended as medical advice for individual conditions or treatments  Talk to your doctor, nurse or pharmacist before following any medical regimen to see if it is safe and effective for you  © 2017 [address] [employer] Information is for End User's use only and may not be sold, redistributed or otherwise used for commercial purposes  All illustrations and images included in CareNotes® are the copyrighted property of A D A M [employer] Inc  or Daren Archuleta

## 2018-09-13 ENCOUNTER — HOSPITAL ENCOUNTER (OUTPATIENT)
Dept: RADIOLOGY | Facility: MEDICAL CENTER | Age: 38
Discharge: HOME | End: 2018-09-13
Payer: COMMERCIAL

## 2018-09-13 DIAGNOSIS — N83.201 RIGHT OVARIAN CYST: ICD-10-CM

## 2018-09-13 PROCEDURE — 76856 US EXAM PELVIC COMPLETE: CPT

## 2018-09-13 PROCEDURE — 76830 TRANSVAGINAL US NON-OB: CPT

## 2018-09-18 ENCOUNTER — TELEPHONE (OUTPATIENT)
Dept: OBGYN CLINIC | Age: 38
End: 2018-09-18

## 2018-09-18 NOTE — TELEPHONE ENCOUNTER
Patient would like a call back regarding her the results of her ultrasound which was done 09/13/18  She is also having pain in her lower abdomen

## 2018-09-18 NOTE — TELEPHONE ENCOUNTER
Patient is calling for results - advised they have not been reviewed  Please review and advise  She is still having left sided abdomen pain - the pain comes and goes, but it is sharp  She also mentioned she is feeling tired - states she gets plenty of sleep so doesn't know why  Patient is worried  Please advise  Thanks!

## 2018-09-26 ENCOUNTER — HOSPITAL ENCOUNTER (OUTPATIENT)
Dept: RADIOLOGY | Facility: MEDICAL CENTER | Age: 38
Discharge: HOME | End: 2018-09-26
Payer: COMMERCIAL

## 2018-09-26 ENCOUNTER — TELEPHONE (OUTPATIENT)
Dept: OBGYN CLINIC | Facility: CLINIC | Age: 38
End: 2018-09-26

## 2018-09-26 DIAGNOSIS — R92.8 ABNORMAL MAMMOGRAM: Primary | ICD-10-CM

## 2018-09-26 DIAGNOSIS — Z12.31 ENCOUNTER FOR SCREENING MAMMOGRAM FOR MALIGNANT NEOPLASM OF BREAST: ICD-10-CM

## 2018-09-26 PROCEDURE — 77067 SCR MAMMO BI INCL CAD: CPT

## 2018-09-26 PROCEDURE — 77063 BREAST TOMOSYNTHESIS BI: CPT

## 2018-09-26 NOTE — TELEPHONE ENCOUNTER
----- Message from Ac Alatorre MD sent at 9/26/2018  3:10 PM EDT -----  Please call Sandra Jenkins- additional breast imaging is needed

## 2018-09-26 NOTE — TELEPHONE ENCOUNTER
Spoke with Pt today via phone call  Pt informed that Radiology recommends additional breast imaging per Dr Duane Guzman review of recent mammogram report  Pt further informed that radiology orders for diagnostic bilateral mammogram w 3d & cad, diagnostic ultrasound for left breast and diagnostic ultrasound for right breast were completed in [facility]  Pt informed that she can contact "Central Scheduling" @ [phone] to schedule additional breast imaging  Reiterated to Pt that if she has any questions/concerns to contact office

## 2018-09-27 ENCOUNTER — OFFICE VISIT (OUTPATIENT)
Dept: GASTROENTEROLOGY | Facility: CLINIC | Age: 38
End: 2018-09-27
Payer: COMMERCIAL

## 2018-09-27 VITALS
HEART RATE: 80 BPM | SYSTOLIC BLOOD PRESSURE: 100 MMHG | BODY MASS INDEX: 27.97 KG/M2 | DIASTOLIC BLOOD PRESSURE: 78 MMHG | WEIGHT: 152 LBS | HEIGHT: 62 IN

## 2018-09-27 DIAGNOSIS — R19.7 DIARRHEA, UNSPECIFIED TYPE: ICD-10-CM

## 2018-09-27 DIAGNOSIS — K59.00 CONSTIPATION, UNSPECIFIED CONSTIPATION TYPE: ICD-10-CM

## 2018-09-27 DIAGNOSIS — R10.32 LEFT LOWER QUADRANT PAIN: Primary | ICD-10-CM

## 2018-09-27 DIAGNOSIS — E73.9 LACTOSE INTOLERANCE IN ADULT: ICD-10-CM

## 2018-09-27 PROCEDURE — 99204 OFFICE O/P NEW MOD 45 MIN: CPT | Performed by: NURSE PRACTITIONER

## 2018-09-27 RX ORDER — DICYCLOMINE HCL 20 MG
20 TABLET ORAL EVERY 6 HOURS PRN
Qty: 30 TABLET | Refills: 0 | Status: SHIPPED | OUTPATIENT
Start: 2018-09-27 | End: 2018-11-08

## 2018-09-27 NOTE — PROGRESS NOTES
Assessment/Plan:    Left lower quadrant abdominal pain, alternating constipation and diarrhea: 1  Initial colonoscopy  2  Blood work- if WBC is elevated patient will obtain a CT scan and we will schedule the colonoscopy in six weeks  We will proceed with the colonoscopy otherwise  3  Dicyclomine     Diagnoses and all orders for this visit:    Left lower quadrant pain  -     CBC and differential; Future  -     CELIAC HLA-DQ,BLOOD; Future  -     Celiac Disease Antibody Profile; Future  -     dicyclomine (BENTYL) 20 mg tablet; Take 1 tablet (20 mg total) by mouth every 6 (six) hours as needed (abdominal pain)    Constipation, unspecified constipation type  -     CBC and differential; Future  -     CELIAC HLA-DQ,BLOOD; Future  -     Celiac Disease Antibody Profile; Future    Diarrhea, unspecified type  -     CBC and differential; Future  -     CELIAC HLA-DQ,BLOOD; Future  -     Celiac Disease Antibody Profile; Future    Lactose intolerance in adult    @ASSESSMENTEN  D@     Subjective:      Patient ID: Marquita Brandon is a 43 y o  female  Patient has had left lower quadrant abdominal pain with alternating diarrhea and constipation since July 2018  She was seen in the emergency room in August 2018 had a WBC count of about 13,000 and had an ultrasound that showed leiomyoma of the uterus on the right side  She was followed by GYN but her symptoms continue  She has no fever, chills, nausea, vomiting  She has a persistent left lower quadrant abdominal pain that gets worse with constipation and then she develops diarrhea after not having a bowel movement for multiple days and the pain gets pretty intense and then subsides  She has no melena or hematochezia  For patient was questioning whether not she has gluten sensitivity because she feels sick at times when she does eat gluten  But she has been feeling sick with everything as of late in her lower GI  She feels rumbling and left lower quadrant pain postprandially  She has never had a colonoscopy before and she has never had dicyclomine before  She does states she has no upper GI symptoms and eats a relatively healthy diet  She does not smoke      Abdominal Pain   This is a recurrent problem  The current episode started more than 1 month ago  The onset quality is sudden  The problem occurs every several days  The most recent episode lasted 2 hours  The problem has been unchanged  The pain is located in the LLQ  The pain is at a severity of 8/10  The quality of the pain is burning, cramping, a sensation of fullness and sharp  The abdominal pain does not radiate  Associated symptoms include arthralgias, constipation, diarrhea, flatus, frequency and nausea  Pertinent negatives include no anorexia, belching, dysuria, fever, headaches, hematochezia, hematuria, melena, myalgias, vomiting or weight loss  The pain is aggravated by being still  The pain is relieved by activity and bowel movements  Prior diagnostic workup includes ultrasound  Constipation   Associated symptoms include abdominal pain, diarrhea, flatus and nausea  Pertinent negatives include no anorexia, fever, hematochezia, melena, vomiting or weight loss  Diarrhea    Associated symptoms include abdominal pain, arthralgias and increased flatus  Pertinent negatives include no fever, headaches, myalgias, vomiting or weight loss  Nausea   Associated symptoms include abdominal pain, arthralgias and nausea  Pertinent negatives include no anorexia, fever, headaches, myalgias or vomiting  The following portions of the patient's history were reviewed and updated as appropriate: She  has a past medical history of Arthritis and Psoriasis  She   Patient Active Problem List    Diagnosis Date Noted    Intramural and subserous leiomyoma of uterus 09/10/2018    Right ovarian cyst 09/10/2018    Dysmenorrhea 09/10/2018     She  has a past surgical history that includes  section    Her family history is not on file   She  reports that she has never smoked  She has never used smokeless tobacco  She reports that she drinks alcohol  She reports that she does not use drugs  Current Outpatient Prescriptions   Medication Sig Dispense Refill    levonorgestrel-ethinyl estradiol (AVIANE,ALESSE,LESSINA) 0 1-20 MG-MCG per tablet Take 1 tablet by mouth daily 84 tablet 3    naproxen (NAPROSYN) 500 mg tablet Take 1 tablet (500 mg total) by mouth every 12 (twelve) hours as needed for mild pain or moderate pain 20 tablet 0    ondansetron (ZOFRAN-ODT) 4 mg disintegrating tablet Take 1 tablet (4 mg total) by mouth every 6 (six) hours as needed for nausea or vomiting 10 tablet 0     No current facility-administered medications for this visit  Current Outpatient Prescriptions on File Prior to Visit   Medication Sig    levonorgestrel-ethinyl estradiol (AVIANE,ALESSE,LESSINA) 0 1-20 MG-MCG per tablet Take 1 tablet by mouth daily    naproxen (NAPROSYN) 500 mg tablet Take 1 tablet (500 mg total) by mouth every 12 (twelve) hours as needed for mild pain or moderate pain    ondansetron (ZOFRAN-ODT) 4 mg disintegrating tablet Take 1 tablet (4 mg total) by mouth every 6 (six) hours as needed for nausea or vomiting     No current facility-administered medications on file prior to visit  She has No Known Allergies       Review of Systems   Constitutional: Negative for fever and weight loss  Gastrointestinal: Positive for abdominal pain, constipation, diarrhea, flatus and nausea  Negative for anorexia, hematochezia, melena and vomiting  Genitourinary: Positive for frequency  Negative for dysuria and hematuria  Musculoskeletal: Positive for arthralgias  Negative for myalgias  Neurological: Negative for headaches           Objective:      /78   Pulse 80   Ht 5' 2" (1 575 m)   Wt 68 9 kg (152 lb)   LMP 09/05/2018 (Exact Date)   BMI 27 80 kg/m²          Physical Exam   Constitutional: She is oriented to person, place, and time  She appears well-developed and well-nourished  Eyes: No scleral icterus  Cardiovascular: Normal rate and regular rhythm  Pulmonary/Chest: Effort normal and breath sounds normal    Abdominal: Soft  Bowel sounds are normal    Lymphadenopathy:     She has no cervical adenopathy  Neurological: She is alert and oriented to person, place, and time  Skin: Skin is warm and dry  Psychiatric: She has a normal mood and affect

## 2018-09-28 ENCOUNTER — APPOINTMENT (OUTPATIENT)
Dept: LAB | Facility: MEDICAL CENTER | Age: 38
End: 2018-09-28
Payer: COMMERCIAL

## 2018-09-28 DIAGNOSIS — R19.7 DIARRHEA, UNSPECIFIED TYPE: ICD-10-CM

## 2018-09-28 DIAGNOSIS — R10.32 LEFT LOWER QUADRANT PAIN: ICD-10-CM

## 2018-09-28 DIAGNOSIS — K59.00 CONSTIPATION, UNSPECIFIED CONSTIPATION TYPE: ICD-10-CM

## 2018-09-28 LAB
BASOPHILS # BLD AUTO: 0.04 THOUSANDS/ΜL (ref 0–0.1)
BASOPHILS NFR BLD AUTO: 1 % (ref 0–1)
EOSINOPHIL # BLD AUTO: 0.16 THOUSAND/ΜL (ref 0–0.61)
EOSINOPHIL NFR BLD AUTO: 3 % (ref 0–6)
ERYTHROCYTE [DISTWIDTH] IN BLOOD BY AUTOMATED COUNT: 12.8 % (ref 11.6–15.1)
HCT VFR BLD AUTO: 37 % (ref 34.8–46.1)
HGB BLD-MCNC: 11.9 G/DL (ref 11.5–15.4)
IMM GRANULOCYTES # BLD AUTO: 0.01 THOUSAND/UL (ref 0–0.2)
IMM GRANULOCYTES NFR BLD AUTO: 0 % (ref 0–2)
LYMPHOCYTES # BLD AUTO: 1.82 THOUSANDS/ΜL (ref 0.6–4.47)
LYMPHOCYTES NFR BLD AUTO: 38 % (ref 14–44)
MCH RBC QN AUTO: 31.1 PG (ref 26.8–34.3)
MCHC RBC AUTO-ENTMCNC: 32.2 G/DL (ref 31.4–37.4)
MCV RBC AUTO: 97 FL (ref 82–98)
MONOCYTES # BLD AUTO: 0.24 THOUSAND/ΜL (ref 0.17–1.22)
MONOCYTES NFR BLD AUTO: 5 % (ref 4–12)
NEUTROPHILS # BLD AUTO: 2.5 THOUSANDS/ΜL (ref 1.85–7.62)
NEUTS SEG NFR BLD AUTO: 53 % (ref 43–75)
NRBC BLD AUTO-RTO: 0 /100 WBCS
PLATELET # BLD AUTO: 288 THOUSANDS/UL (ref 149–390)
PMV BLD AUTO: 11.1 FL (ref 8.9–12.7)
RBC # BLD AUTO: 3.83 MILLION/UL (ref 3.81–5.12)
WBC # BLD AUTO: 4.77 THOUSAND/UL (ref 4.31–10.16)

## 2018-09-28 PROCEDURE — 85025 COMPLETE CBC W/AUTO DIFF WBC: CPT

## 2018-09-28 PROCEDURE — 81377 HLA II TYPE 1 AG EQUIV LR: CPT

## 2018-09-28 PROCEDURE — 81383 HLA II TYPING 1 ALLELE HR: CPT

## 2018-09-28 PROCEDURE — 82784 ASSAY IGA/IGD/IGG/IGM EACH: CPT

## 2018-09-28 PROCEDURE — 36415 COLL VENOUS BLD VENIPUNCTURE: CPT

## 2018-09-28 PROCEDURE — 83516 IMMUNOASSAY NONANTIBODY: CPT

## 2018-09-28 PROCEDURE — 86255 FLUORESCENT ANTIBODY SCREEN: CPT

## 2018-09-29 LAB
ENDOMYSIUM IGA SER QL: NEGATIVE
GLIADIN PEPTIDE IGA SER-ACNC: 2 UNITS (ref 0–19)
GLIADIN PEPTIDE IGG SER-ACNC: 2 UNITS (ref 0–19)
IGA SERPL-MCNC: 95 MG/DL (ref 87–352)
TTG IGA SER-ACNC: <2 U/ML (ref 0–3)
TTG IGG SER-ACNC: <2 U/ML (ref 0–5)

## 2018-10-03 ENCOUNTER — HOSPITAL ENCOUNTER (OUTPATIENT)
Dept: RADIOLOGY | Facility: HOSPITAL | Age: 38
Discharge: HOME | End: 2018-10-03
Payer: COMMERCIAL

## 2018-10-03 DIAGNOSIS — R92.8 ABNORMAL MAMMOGRAM: ICD-10-CM

## 2018-10-03 PROCEDURE — 77066 DX MAMMO INCL CAD BI: CPT

## 2018-10-03 PROCEDURE — 76642 ULTRASOUND BREAST LIMITED: CPT

## 2018-10-03 PROCEDURE — G0279 TOMOSYNTHESIS, MAMMO: HCPCS

## 2018-10-04 LAB
ANNOTATION COMMENT IMP: NORMAL
HLA-DQ2 QL: POSITIVE
HLA-DQ8 QL: NEGATIVE
REF LAB TEST METHOD: NORMAL

## 2018-10-04 NOTE — PROGRESS NOTES
Pt met with Dr Deandra Mejia regarding recommendation for;      ___X__ RIGHT ______LEFT      __X___Ultrasound guided  ______Stereotactic breast biopsy  Call placed to patient, procedure explained, questions answered,       __X___Verbalized understanding        Blood thinners:  _____yes __X___no    Date stopped: ____N/A_____    Biopsy teaching sheet given:  _______yes ___X___no (Teaching points discussed, questions answered, biopsy scheduled and pt aware she needs to arrive at [facility] for 0830 biopsy on 10/8/18)

## 2018-10-05 ENCOUNTER — TELEPHONE (OUTPATIENT)
Dept: OBGYN CLINIC | Facility: CLINIC | Age: 38
End: 2018-10-05

## 2018-10-05 DIAGNOSIS — R92.2 DENSE BREAST TISSUE: ICD-10-CM

## 2018-10-05 DIAGNOSIS — R92.8 ABNORMAL MAMMOGRAM: Primary | ICD-10-CM

## 2018-10-05 NOTE — TELEPHONE ENCOUNTER
----- Message from Alice Orr MD sent at 10/5/2018 10:54 AM EDT -----  Please call Brittaney Cronin biopsy of breast recommended as well as follow up 6 month imagine  PLease put in the necessary orders

## 2018-10-05 NOTE — TELEPHONE ENCOUNTER
Patient was already aware of the results  She has her biopsy scheduled for next week  Mailed the order for 6 month diagnostic mammo of left breast to pt

## 2018-10-08 ENCOUNTER — HOSPITAL ENCOUNTER (OUTPATIENT)
Dept: MAMMOGRAPHY | Facility: CLINIC | Age: 38
Discharge: HOME | End: 2018-10-08

## 2018-10-08 ENCOUNTER — HOSPITAL ENCOUNTER (OUTPATIENT)
Dept: ULTRASOUND IMAGING | Facility: CLINIC | Age: 38
Discharge: HOME | End: 2018-10-08
Admitting: RADIOLOGY
Payer: COMMERCIAL

## 2018-10-08 ENCOUNTER — HOSPITAL ENCOUNTER (OUTPATIENT)
Dept: ULTRASOUND IMAGING | Facility: CLINIC | Age: 38
Discharge: HOME | End: 2018-10-08

## 2018-10-08 VITALS — DIASTOLIC BLOOD PRESSURE: 77 MMHG | HEART RATE: 62 BPM | SYSTOLIC BLOOD PRESSURE: 128 MMHG

## 2018-10-08 DIAGNOSIS — R92.8 ABNORMAL MAMMOGRAM: ICD-10-CM

## 2018-10-08 PROCEDURE — 88305 TISSUE EXAM BY PATHOLOGIST: CPT | Performed by: PATHOLOGY

## 2018-10-08 PROCEDURE — 19083 BX BREAST 1ST LESION US IMAG: CPT

## 2018-10-08 RX ORDER — LIDOCAINE HYDROCHLORIDE 10 MG/ML
5 INJECTION, SOLUTION INFILTRATION; PERINEURAL ONCE
Status: COMPLETED | OUTPATIENT
Start: 2018-10-08 | End: 2018-10-08

## 2018-10-08 RX ADMIN — LIDOCAINE HYDROCHLORIDE 5 ML: 10 INJECTION, SOLUTION INFILTRATION; PERINEURAL at 09:42

## 2018-10-08 NOTE — PROGRESS NOTES
Ice pack given:    ___x__yes _____no    Discharge instructions signed by patient:    ___x__yes _____no    Discharge instructions given to patient:    ___x__yes _____no    Discharged via:    ___x__amulatory    _____wheelchair    _____stretcher    Stable on discharge:    ___x__yes ____no  Patient would like results over the phone

## 2018-10-08 NOTE — PROGRESS NOTES
Procedure type:    ___x__ultrasound guided _____stereotactic    Breast:    _____Left __x___Right    Location: 11 o'clock 4 CMFn    Needle: 12 Gauge Rosalino    # of passes: 3       Clip: Hayder    Performed by: Dr Eulalio Glaser held for 5 minutes by: Helena Reyes     Steri Strips:    ___x__yes _____no    Band aid:    ___x__yes_____no    Tape and guaze:    _____yes ___x__no    Tolerated procedure:    ___x__yes _____no

## 2018-10-08 NOTE — DISCHARGE INSTR - OTHER ORDERS
POST LARGE CORE BREAST BIOPSY PATIENT INFORMATION      1  Place an ice pack inside your bra over the top of the dressing every hour for 20 minutes (20 minutes on, 60 minutes off)  Do this until bedtime  2  Do not shower or bathe until the following morning  3  You may bathe your breast carefully with the steri-strips in place  Be careful    Not to loosen them  The steri-strips will fall off in 3-5 days  4  You may have mild discomfort, and you may have some bruising where the   Needle entered the skin  This should clear within 5-7 days  5  If you need medicine for discomfort, take acetaminophen products such as   Tylenol  You may also take Advil or Motrin products  6  Do not participate in strenuous activities such as-tennis, aerobics, skiing,  Weight lifting, etc  for 24 hours  Refrain from swimming/soaking for 72 hours  7  Wearing a bra for sleeping may be more comfortable for the first 24-48 hours  8  Watch for continued bleeding, pain or fever over 101; please call with any questions or concerns  For procedures done at the \[facility]\""  [facility] call:  Ralph Cerda RN at [phone]  Jennifer Langley RN at [phone]                    *After 4 PM call the Interventional Radiology Department                    [phone] and ask to speak with the nurse on call  For procedures done at the [address] [place] [address] call:         Anali Harrison RN at   *After 4 PM call the Interventional Radiology Department   [phone] and ask to speak with the nurse on call  For procedures done at [address] [place] [address] call: The Radiology Nurse at [phone]  *After 4 PM call your physician, or go to the Emergency Department  9          The final results of your biopsy are usually available within one week

## 2018-10-09 NOTE — PROGRESS NOTES
Post procedure call completed    Bleeding: _____yes __X___no    Pain: _____yes __X____no (minor discomfort)    Redness/Swelling: ______yes __X___no    Band aid removed: __X___yes _____no    Steri-Strips intact: ___X___yes _____no

## 2018-10-10 ENCOUNTER — TELEPHONE (OUTPATIENT)
Dept: MAMMOGRAPHY | Facility: CLINIC | Age: 38
End: 2018-10-10

## 2018-10-10 NOTE — TELEPHONE ENCOUNTER
Internal EPIC Email sent to Dr Nishi Wilkinson:    I just wanted to let you know that the above patient was given her negative breast biopsy results  The patient had no questions and was advised to return to yearly screenings  If you have any questions or need further assistance please let me know      Thank you,  Rochelle Kong, MSN, RN  Breast Nurse Navigator

## 2018-10-11 ENCOUNTER — HOSPITAL ENCOUNTER (OUTPATIENT)
Facility: HOSPITAL | Age: 38
Setting detail: OUTPATIENT SURGERY
Discharge: HOME | End: 2018-10-11
Attending: INTERNAL MEDICINE | Admitting: INTERNAL MEDICINE
Payer: COMMERCIAL

## 2018-10-11 ENCOUNTER — ANESTHESIA (OUTPATIENT)
Dept: PERIOP | Facility: HOSPITAL | Age: 38
End: 2018-10-11
Payer: COMMERCIAL

## 2018-10-11 ENCOUNTER — ANESTHESIA EVENT (OUTPATIENT)
Dept: PERIOP | Facility: HOSPITAL | Age: 38
End: 2018-10-11
Payer: COMMERCIAL

## 2018-10-11 VITALS
BODY MASS INDEX: 27.51 KG/M2 | SYSTOLIC BLOOD PRESSURE: 114 MMHG | HEIGHT: 62 IN | OXYGEN SATURATION: 100 % | DIASTOLIC BLOOD PRESSURE: 76 MMHG | HEART RATE: 65 BPM | TEMPERATURE: 97.9 F | WEIGHT: 149.47 LBS | RESPIRATION RATE: 20 BRPM

## 2018-10-11 DIAGNOSIS — R10.32 LLQ PAIN: ICD-10-CM

## 2018-10-11 DIAGNOSIS — R76.8 OTHER SPECIFIED ABNORMAL IMMUNOLOGICAL FINDINGS IN SERUM: ICD-10-CM

## 2018-10-11 LAB — EXT PREGNANCY TEST URINE: NEGATIVE

## 2018-10-11 PROCEDURE — 88305 TISSUE EXAM BY PATHOLOGIST: CPT | Performed by: PATHOLOGY

## 2018-10-11 PROCEDURE — 88313 SPECIAL STAINS GROUP 2: CPT | Performed by: PATHOLOGY

## 2018-10-11 PROCEDURE — 88341 IMHCHEM/IMCYTCHM EA ADD ANTB: CPT | Performed by: PATHOLOGY

## 2018-10-11 PROCEDURE — 88342 IMHCHEM/IMCYTCHM 1ST ANTB: CPT | Performed by: PATHOLOGY

## 2018-10-11 PROCEDURE — 81025 URINE PREGNANCY TEST: CPT | Performed by: ANESTHESIOLOGY

## 2018-10-11 PROCEDURE — 45380 COLONOSCOPY AND BIOPSY: CPT | Performed by: INTERNAL MEDICINE

## 2018-10-11 PROCEDURE — 43239 EGD BIOPSY SINGLE/MULTIPLE: CPT | Performed by: INTERNAL MEDICINE

## 2018-10-11 RX ORDER — LIDOCAINE HYDROCHLORIDE 10 MG/ML
INJECTION, SOLUTION INFILTRATION; PERINEURAL AS NEEDED
Status: DISCONTINUED | OUTPATIENT
Start: 2018-10-11 | End: 2018-10-11 | Stop reason: SURG

## 2018-10-11 RX ORDER — SODIUM CHLORIDE, SODIUM LACTATE, POTASSIUM CHLORIDE, CALCIUM CHLORIDE 600; 310; 30; 20 MG/100ML; MG/100ML; MG/100ML; MG/100ML
INJECTION, SOLUTION INTRAVENOUS CONTINUOUS PRN
Status: DISCONTINUED | OUTPATIENT
Start: 2018-10-11 | End: 2018-10-11 | Stop reason: SURG

## 2018-10-11 RX ORDER — PROPOFOL 10 MG/ML
INJECTION, EMULSION INTRAVENOUS AS NEEDED
Status: DISCONTINUED | OUTPATIENT
Start: 2018-10-11 | End: 2018-10-11 | Stop reason: SURG

## 2018-10-11 RX ORDER — SODIUM CHLORIDE, SODIUM LACTATE, POTASSIUM CHLORIDE, CALCIUM CHLORIDE 600; 310; 30; 20 MG/100ML; MG/100ML; MG/100ML; MG/100ML
125 INJECTION, SOLUTION INTRAVENOUS CONTINUOUS
Status: DISCONTINUED | OUTPATIENT
Start: 2018-10-11 | End: 2018-10-11 | Stop reason: HOSPADM

## 2018-10-11 RX ADMIN — PROPOFOL 50 MG: 10 INJECTION, EMULSION INTRAVENOUS at 09:46

## 2018-10-11 RX ADMIN — PROPOFOL 100 MG: 10 INJECTION, EMULSION INTRAVENOUS at 09:37

## 2018-10-11 RX ADMIN — PROPOFOL 50 MG: 10 INJECTION, EMULSION INTRAVENOUS at 09:54

## 2018-10-11 RX ADMIN — SODIUM CHLORIDE, SODIUM LACTATE, POTASSIUM CHLORIDE, AND CALCIUM CHLORIDE: .6; .31; .03; .02 INJECTION, SOLUTION INTRAVENOUS at 09:29

## 2018-10-11 RX ADMIN — PROPOFOL 100 MG: 10 INJECTION, EMULSION INTRAVENOUS at 09:36

## 2018-10-11 RX ADMIN — PROPOFOL 50 MG: 10 INJECTION, EMULSION INTRAVENOUS at 09:42

## 2018-10-11 RX ADMIN — LIDOCAINE HYDROCHLORIDE 50 MG: 10 INJECTION, SOLUTION INFILTRATION; PERINEURAL at 09:37

## 2018-10-11 RX ADMIN — PROPOFOL 50 MG: 10 INJECTION, EMULSION INTRAVENOUS at 09:50

## 2018-10-11 RX ADMIN — PROPOFOL 50 MG: 10 INJECTION, EMULSION INTRAVENOUS at 09:39

## 2018-10-11 RX ADMIN — PROPOFOL 50 MG: 10 INJECTION, EMULSION INTRAVENOUS at 09:38

## 2018-10-11 NOTE — OP NOTE
Postoperative Note  PATIENT NAME: Madiha Monahan YOB: 1980  MRN: [id number]  [facility] RO[facility] 01    Surgery Date: 10/11/2018    POST-OP DIAGNOSIS: See the impression below    SEDATION: Monitored anesthesia care, check anesthesia records    PHYSICAL EXAM:  Vitals:    10/11/18 0828   BP: 115/74   Pulse: 77   Resp: 18   Temp: 97 9 °F (36 6 °C)   SpO2: 100%     Body mass index is 27 34 kg/m²  General: NAD  Heart: S1 & S2 normal, RRR  Lungs: CTA, No rales or rhonchi  Abdomen: Soft, nontender, nondistended, good bowel sounds    CONSENT:  Informed consent was obtained for the procedure, including sedation after explaining the risks and benefits of the procedure  Risks including but not limited to bleeding, perforation, infection, aspiration were discussed in detail  Also explained about less than 100%$ sensitivity with the exam and other alternatives  DESCRIPTION:   Procedure:  Esophagogastroduodenoscopy with duodenal biopsy  43576    Indications:  44-year-old female with abdominal pain and possible celiac disease on serology    ASA 2    Premedicated with mac  Anesthesia    Patient is identified by me  She is examined prior to the procedure and found to be in stable condition  She is attached to cardiac monitor and pulse oximeter and placed in the left lateral position  After adequate intravenous sedation the Olympus video gastroscope was inserted under direct vision into the esophagus  The esophageal mucosa is completely normal throughout its length with a normal Z-line at 36 cm from the incisors  The stomach is entered  The body and antrum normal   The pylorus was normal   The duodenum was cannulated deep into the 3rd portion and is normal   Retroversion reveals normal GE junction and fundus  Of note is that no scalloping is seen in the duodenum and the villi appear normal   Biopsies taken from the 3rd portion of the duodenum with excellent hemostasis    She tolerated the procedure well and was stable throughout  My impression:  Normal upper GI endoscopy, status post biopsy    RECOMMENDATIONS:  Follow up biopsy results in 1 week    COMPLICATIONS:  None; patient tolerated the procedure well  DISPOSITION: PACU  CONDITION: Stable    Samuel Huertas MD  10/11/2018,9:39 AM        Portions of the record may have been created with voice recognition software   Occasional wrong word or "sound a like" substitutions may have occurred due to the inherent limitations of voice recognition software   Read the chart carefully and recognize, using context, where substitutions have occurred

## 2018-10-11 NOTE — ANESTHESIA PREPROCEDURE EVALUATION
Review of Systems/Medical History  Patient summary reviewed  Chart reviewed  No history of anesthetic complications     Cardiovascular  Exercise tolerance (METS): >4,     Pulmonary  Negative pulmonary ROS        GI/Hepatic  Negative GI/hepatic ROS          Negative  ROS        Endo/Other  Negative endo/other ROS      GYN  Negative gynecology ROS          Hematology  Negative hematology ROS      Musculoskeletal  Negative musculoskeletal ROS        Neurology  Negative neurology ROS      Psychology   Negative psychology ROS              Physical Exam    Airway    Mallampati score: I         Dental   No notable dental hx     Cardiovascular  Rhythm: regular, Rate: normal,     Pulmonary  Pulmonary exam normal     Other Findings        Anesthesia Plan  ASA Score- 2     Anesthesia Type- IV sedation with anesthesia with ASA Monitors  Additional Monitors:   Airway Plan:         Plan Factors-Patient not instructed to abstain from smoking on day of procedure  Patient did not smoke on day of surgery  Induction- intravenous  Postoperative Plan-     Informed Consent- Anesthetic plan and risks discussed with patient and mother  I personally reviewed this patient with the CRNA  Discussed and agreed on the Anesthesia Plan with the CRNA  Arnold Hutton

## 2018-10-11 NOTE — DISCHARGE INSTR - AVS FIRST PAGE
Postoperative Note  PATIENT NAME: Jh Garnica  : 1980  MRN: [id number]  [facility]    Surgery Date: 10/11/2018    POST-OP DIAGNOSIS: See the impression below    SEDATION: Monitored anesthesia care, check anesthesia records    PHYSICAL EXAM:  Vitals:    10/11/18 0828   BP: 115/74   Pulse: 77   Resp: 18   Temp: 97 9 °F (36 6 °C)   SpO2: 100%     Body mass index is 27 34 kg/m²  General: NAD  Heart: S1 & S2 normal, RRR  Lungs: CTA, No rales or rhonchi  Abdomen: Soft, nontender, nondistended, good bowel sounds    CONSENT:  Informed consent was obtained for the procedure, including sedation after explaining the risks and benefits of the procedure  Risks including but not limited to bleeding, perforation, infection, aspiration were discussed in detail  Also explained about less than 100%$ sensitivity with the exam and other alternatives  DESCRIPTION:   Procedure:  Esophagogastroduodenoscopy with duodenal biopsy  24070    Indications:  42-year-old female with abdominal pain and possible celiac disease on serology    ASA 2    Premedicated with mac  Anesthesia    Patient is identified by me  She is examined prior to the procedure and found to be in stable condition  She is attached to cardiac monitor and pulse oximeter and placed in the left lateral position  After adequate intravenous sedation the Olympus video gastroscope was inserted under direct vision into the esophagus  The esophageal mucosa is completely normal throughout its length with a normal Z-line at 36 cm from the incisors  The stomach is entered  The body and antrum normal   The pylorus was normal   The duodenum was cannulated deep into the 3rd portion and is normal   Retroversion reveals normal GE junction and fundus  Of note is that no scalloping is seen in the duodenum and the villi appear normal   Biopsies taken from the 3rd portion of the duodenum with excellent hemostasis    She tolerated the procedure well and was stable throughout  My impression:  Normal upper GI endoscopy, status post biopsy    RECOMMENDATIONS:  Follow up biopsy results in 1 week    COMPLICATIONS:  None; patient tolerated the procedure well  DISPOSITION: PACU  CONDITION: Stable    Lilo So MD  10/11/2018,9:39 AM        Portions of the record may have been created with voice recognition software   Occasional wrong word or "sound a like" substitutions may have occurred due to the inherent limitations of voice recognition software   Read the chart carefully and recognize, using context, where substitutions have occurred  Postoperative Note  PATIENT NAME: Salvatore Lucero  : 1980  MRN: [id number]  [facility]    Surgery Date: 10/11/2018    POST-OP DIAGNOSIS: See the impression below    SEDATION: Monitored anesthesia care, check anesthesia records    PHYSICAL EXAM:  Vitals:    10/11/18 0828   BP: 115/74   Pulse: 77   Resp: 18   Temp: 97 9 °F (36 6 °C)   SpO2: 100%     Body mass index is 27 34 kg/m²  General: NAD  Heart: S1 & S2 normal, RRR  Lungs: CTA, No rales or rhonchi  Abdomen: Soft, nontender, nondistended, good bowel sounds    CONSENT:  Informed consent was obtained for the procedure, including sedation after explaining the risks and benefits of the procedure  Risks including but not limited to bleeding, perforation, infection, aspiration were discussed in detail  Also explained about less than 100%$ sensitivity with the exam and other alternatives  DESCRIPTION:   Procedure:  Esophagogastroduodenoscopy with duodenal biopsy  39177    Indications:  58-year-old female with abdominal pain and possible celiac disease on serology    ASA 2    Premedicated with mac  Anesthesia    Patient is identified by me  She is examined prior to the procedure and found to be in stable condition  She is attached to cardiac monitor and pulse oximeter and placed in the left lateral position    After adequate intravenous sedation the Photo Rankr video gastroscope was inserted under direct vision into the esophagus  The esophageal mucosa is completely normal throughout its length with a normal Z-line at 36 cm from the incisors  The stomach is entered  The body and antrum normal   The pylorus was normal   The duodenum was cannulated deep into the 3rd portion and is normal   Retroversion reveals normal GE junction and fundus  Of note is that no scalloping is seen in the duodenum and the villi appear normal   Biopsies taken from the 3rd portion of the duodenum with excellent hemostasis  She tolerated the procedure well and was stable throughout  My impression:  Normal upper GI endoscopy, status post biopsy    RECOMMENDATIONS:  Follow up biopsy results in 1 week    COMPLICATIONS:  None; patient tolerated the procedure well  DISPOSITION: PACU  CONDITION: Stable    Hema Hernandez MD  10/11/2018,9:39 AM        Portions of the record may have been created with voice recognition software   Occasional wrong word or "sound a like" substitutions may have occurred due to the inherent limitations of voice recognition software   Read the chart carefully and recognize, using context, where substitutions have occurred

## 2018-10-11 NOTE — OP NOTE
Postoperative Note  PATIENT NAME: Edgardo Lawrence  : 1980  MRN: [id number]  [facility]    Surgery Date: 10/11/2018    POST-OP DIAGNOSIS: See the impression below    SEDATION: Monitored anesthesia care, check anesthesia records    PHYSICAL EXAM:  Vitals:    10/11/18 0828   BP: 115/74   Pulse: 77   Resp: 18   Temp: 97 9 °F (36 6 °C)   SpO2: 100%     Body mass index is 27 34 kg/m²  General: NAD  Heart: S1 & S2 normal, RRR  Lungs: CTA, No rales or rhonchi  Abdomen: Soft, nontender, nondistended, good bowel sounds    CONSENT:  Informed consent was obtained for the procedure, including sedation after explaining the risks and benefits of the procedure  Risks including but not limited to bleeding, perforation, infection, aspiration were discussed in detail  Also explained about less than 100%$ sensitivity with the exam and other alternatives  DESCRIPTION:   Procedure:  Fiberoptic colonoscopy with biopsy  68137    Indications:  59-year-old female with left lower quadrant pain, alternating bowel habits with constipation and diarrhea    ASA 2    Premedicated with mac anesthesia    Patient is identified by me  She is examined prior to the procedure and found to be in stable condition  She is attached to cardiac monitor and pulse oximeter and placed in the left lateral position  After adequate intravenous sedation the Olympus video colonoscope was inserted and passed easily to the cecum  The ileocecal valve and the appendiceal orifice are identified and the valve was cannulated  The terminal ileum is examined for approximately 10 cm  The scope was then slowly withdrawn with good circumferential visualization of the mucosa  Preparation is adequate  Ileum as well as the entire and the colonic mucosa are unremarkable with no inflammatory neoplastic or vascular lesions noted  Retroversion is unremarkable    Biopsies taken of terminal ileum ascending, and descending colon to rule out inflammatory bowel disease and microscopic colitis  There is excellent hemostasis  She tolerated the procedure well and was stable throughout  My impression:  Normal colon and terminal ileum, status post biopsy    RECOMMENDATIONS:  Follow up biopsy results in 1 week  Follow-up colonoscopy in 10 years    COMPLICATIONS:  None; patient tolerated the procedure well  DISPOSITION: PACU  CONDITION: Stable    Madiha Duran MD  10/11/2018,9:56 AM        Portions of the record may have been created with voice recognition software   Occasional wrong word or "sound a like" substitutions may have occurred due to the inherent limitations of voice recognition software   Read the chart carefully and recognize, using context, where substitutions have occurred

## 2018-10-11 NOTE — ANESTHESIA POSTPROCEDURE EVALUATION
Post-Op Assessment Note      CV Status:  Stable    Mental Status:  Alert and awake    Hydration Status:  Euvolemic    PONV Controlled:  Controlled    Airway Patency:  Patent    Post Op Vitals Reviewed: Yes          Staff: CRNA, Anesthesiologist           /58 (10/11/18 1001)    Temp      Pulse 78 (10/11/18 1001)   Resp 17 (10/11/18 1001)    SpO2 97 % (10/11/18 1001)

## 2018-10-11 NOTE — DISCHARGE INSTRUCTIONS
Colonoscopy   WHAT YOU NEED TO KNOW:   A colonoscopy is a procedure to examine the inside of your colon (intestine) with a scope  Polyps or tissue growths may have been removed during your colonoscopy  It is normal to feel bloated and to have some abdominal discomfort  You should be passing gas  If you have hemorrhoids or you had polyps removed, you may have a small amount of bleeding  DISCHARGE INSTRUCTIONS:   Seek care immediately if:   · You have a large amount of bright red blood in your bowel movements  · Your abdomen is hard and firm and you have severe pain  · You have sudden trouble breathing  Contact your healthcare provider if:   · You develop a rash or hives  · You have a fever within 24 hours of your procedure  · You have not had a bowel movement for 3 days after your procedure  · You have questions or concerns about your condition or care  Activity:   · Do not lift, strain, or run  for 3 days after your procedure  · Rest after your procedure  You have been given medicine to relax you  Do not  drive or make important decisions until the day after your procedure  Return to your normal activity as directed  · Relieve gas and discomfort from bloating  by lying on your right side with a heating pad on your abdomen  You may need to take short walks to help the gas move out  Eat small meals until bloating is relieved  If you had polyps removed: For 7 days after your procedure:  · Do not  take aspirin  · Do not  go on long car rides  Help prevent constipation:   · Eat a variety of healthy foods  Healthy foods include fruit, vegetables, whole-grain breads, low-fat dairy products, beans, lean meat, and fish  Ask if you need to be on a special diet  Your healthcare provider may recommend that you eat high-fiber foods such as cooked beans  Fiber helps you have regular bowel movements  · Drink liquids as directed    Adults should drink between 9 and 13 eight-ounce cups of liquid every day  Ask what amount is best for you  For most people, good liquids to drink are water, juice, and milk  · Exercise as directed  Talk to your healthcare provider about the best exercise plan for you  Exercise can help prevent constipation, decrease your blood pressure and improve your health  Follow up with your healthcare provider as directed:  Write down your questions so you remember to ask them during your visits  © 2017 [address] [employer] Information is for End User's use only and may not be sold, redistributed or otherwise used for commercial purposes  All illustrations and images included in CareNotes® are the copyrighted property of A D [employer] M [employer] Inc  or Daren Archuleta  The above information is an  only  It is not intended as medical advice for individual conditions or treatments  Talk to your doctor, nurse or pharmacist before following any medical regimen to see if it is safe and effective for you  Upper Endoscopy   WHAT YOU NEED TO KNOW:   An upper endoscopy is also called an upper gastrointestinal (GI) endoscopy, or an esophagogastroduodenoscopy (EGD)  You may feel bloated, gassy, or have some abdominal discomfort after your procedure  Your throat may be sore for 24 to 36 hours  You may burp or pass gas from air that is still inside your body  DISCHARGE INSTRUCTIONS:   Call 911 for any of the following:   · You have sudden chest pain or trouble breathing  Seek care immediately if:   · You feel dizzy or faint  · You have trouble swallowing  · Your bowel movements are very dark or black  · Your abdomen is hard and firm and you have severe pain  · You vomit blood  Contact your healthcare provider if:   · You feel full or bloated and cannot burp or pass gas  · You have not had a bowel movement for 3 days after your procedure  · You have neck pain  · You have a fever or chills  · You have nausea or are vomiting      · You have a rash or hives  · You have questions or concerns about your endoscopy  Relieve a sore throat:  Suck on throat lozenges or crushed ice  Gargle with a small amount of warm salt water  Mix 1 teaspoon of salt and 1 cup of warm water to make salt water  Relieve gas and discomfort from bloating:  Lie on your right side with a heating pad on your abdomen  Take short walks to help pass gas  Eat small meals until bloating is relieved  Rest after your procedure: You have been given medicine to relax you  Do not  drive or make important decisions until the day after your procedure  Return to your normal activity as directed  You can usually return to work the day after your procedure  Follow up with your healthcare provider as directed:  Write down your questions so you remember to ask them during your visits  © 2017 [address] [employer] is for End User's use only and may not be sold, redistributed or otherwise used for commercial purposes  All illustrations and images included in CareNotes® are the copyrighted property of A [employer] M [employer] Inc  or Daren Archuleta  The above information is an  only  It is not intended as medical advice for individual conditions or treatments  Talk to your doctor, nurse or pharmacist before following any medical regimen to see if it is safe and effective for you

## 2018-10-18 ENCOUNTER — TELEPHONE (OUTPATIENT)
Dept: GASTROENTEROLOGY | Facility: CLINIC | Age: 38
End: 2018-10-18

## 2018-11-08 ENCOUNTER — OFFICE VISIT (OUTPATIENT)
Dept: GASTROENTEROLOGY | Facility: CLINIC | Age: 38
End: 2018-11-08
Payer: COMMERCIAL

## 2018-11-08 VITALS
BODY MASS INDEX: 28.16 KG/M2 | HEIGHT: 62 IN | SYSTOLIC BLOOD PRESSURE: 115 MMHG | HEART RATE: 80 BPM | WEIGHT: 153 LBS | DIASTOLIC BLOOD PRESSURE: 70 MMHG

## 2018-11-08 DIAGNOSIS — K58.0 IRRITABLE BOWEL SYNDROME WITH DIARRHEA: Primary | ICD-10-CM

## 2018-11-08 PROCEDURE — 99213 OFFICE O/P EST LOW 20 MIN: CPT | Performed by: NURSE PRACTITIONER

## 2018-11-08 RX ORDER — DICYCLOMINE HYDROCHLORIDE 10 MG/1
10 CAPSULE ORAL
Qty: 90 CAPSULE | Refills: 6 | Status: SHIPPED | OUTPATIENT
Start: 2018-11-08 | End: 2019-10-18

## 2018-11-08 NOTE — PROGRESS NOTES
Assessment/Plan:    IBS with diarrhea and constipation,     1  Dicyclomine 10 mg during the day so the patient does not get tired  She can take 20 mg tablets before bed if needed  2 Dietary and exercise discussed with patient as well as probiotics  3 Patient does not tolerate Imodium and she has not had any diarrhea as of late but will call me if she does       Diagnoses and all orders for this visit:    Irritable bowel syndrome with diarrhea  -     dicyclomine (BENTYL) 10 mg capsule; Take 1 capsule (10 mg total) by mouth 3 (three) times a day before meals for 30 days            Subjective:      Patient ID: Michelle Velázquez is a 43 y o  female  Overall she has had no lower GI symptoms since the colonoscopy  She had a normal colonoscopy and normal biopsy  She has a good appetite but does states she is lactose intolerant and so she will avoid lactose  She does feel that she has that gluten sensitivity but her celiac panel and endoscopy did not show that  She had a celiac genetic test done and she carries the DQ2 Teddy Daryl only and I told her there is a small chance that she has some gluten sensitivity and she she should notice she has any abdominal bloating loose stools that her worsened after eating gluten  She will keep track of that  We discussed IBS and managing stress encouraging exercise and healthy varied diet  The following portions of the patient's history were reviewed and updated as appropriate: She  has a past medical history of Arthritis and Psoriasis    She   Patient Active Problem List    Diagnosis Date Noted    LLQ pain 2018    Diarrhea 2018    Constipation 2018    Left lower quadrant pain 2018    Lactose intolerance in adult 2018    Intramural and subserous leiomyoma of uterus 09/10/2018    Right ovarian cyst 09/10/2018    Dysmenorrhea 09/10/2018     She  has a past surgical history that includes  section; US guided breast biopsy right complete (Right, 10/8/2018); and pr colonoscopy flx dx w/collj spec when pfrmd (N/A, 10/11/2018)  Her family history is not on file  She  reports that she has never smoked  She has never used smokeless tobacco  She reports that she drinks about 1 2 oz of alcohol per week   She reports that she does not use drugs  Current Outpatient Prescriptions   Medication Sig Dispense Refill    dicyclomine (BENTYL) 10 mg capsule Take 1 capsule (10 mg total) by mouth 3 (three) times a day before meals for 30 days 90 capsule 6    levonorgestrel-ethinyl estradiol (AVIANE,ALESSE,LESSINA) 0 1-20 MG-MCG per tablet Take 1 tablet by mouth daily (Patient not taking: Reported on 11/8/2018 ) 84 tablet 3     No current facility-administered medications for this visit  Current Outpatient Prescriptions on File Prior to Visit   Medication Sig    levonorgestrel-ethinyl estradiol (AVIANE,ALESSE,LESSINA) 0 1-20 MG-MCG per tablet Take 1 tablet by mouth daily (Patient not taking: Reported on 11/8/2018 )    [DISCONTINUED] dicyclomine (BENTYL) 20 mg tablet Take 1 tablet (20 mg total) by mouth every 6 (six) hours as needed (abdominal pain)     No current facility-administered medications on file prior to visit  She has No Known Allergies       Review of Systems   Constitutional: Negative for activity change, appetite change, chills, diaphoresis, fatigue and unexpected weight change  HENT: Negative for mouth sores, sore throat, trouble swallowing and voice change  Eyes: Negative for pain  Respiratory: Negative  Cardiovascular: Negative  Gastrointestinal: Negative  Endocrine: Negative  Musculoskeletal: Negative for arthralgias  Skin: Negative  Hematological: Negative for adenopathy  Does not bruise/bleed easily  Psychiatric/Behavioral: Negative            Objective:      /70   Pulse 80   Ht 5' 2" (1 575 m)   Wt 69 4 kg (153 lb)   LMP 10/10/2018   BMI 27 98 kg/m²          Physical Exam   Constitutional: She is oriented to person, place, and time  She appears well-developed and well-nourished  Eyes: No scleral icterus  Cardiovascular: Normal rate and regular rhythm  Pulmonary/Chest: Effort normal and breath sounds normal    Abdominal: Soft  Bowel sounds are normal    Lymphadenopathy:     She has no cervical adenopathy  Neurological: She is alert and oriented to person, place, and time  Skin: Skin is warm and dry  Psychiatric: She has a normal mood and affect

## 2019-04-02 ENCOUNTER — ANNUAL EXAM (OUTPATIENT)
Dept: OBGYN CLINIC | Facility: CLINIC | Age: 39
End: 2019-04-02
Payer: COMMERCIAL

## 2019-04-02 VITALS
WEIGHT: 157.25 LBS | SYSTOLIC BLOOD PRESSURE: 112 MMHG | BODY MASS INDEX: 28.94 KG/M2 | HEIGHT: 62 IN | DIASTOLIC BLOOD PRESSURE: 84 MMHG

## 2019-04-02 DIAGNOSIS — Z12.31 ENCOUNTER FOR SCREENING MAMMOGRAM FOR MALIGNANT NEOPLASM OF BREAST: ICD-10-CM

## 2019-04-02 DIAGNOSIS — R92.2 DENSE BREAST TISSUE: ICD-10-CM

## 2019-04-02 DIAGNOSIS — N83.201 RIGHT OVARIAN CYST: ICD-10-CM

## 2019-04-02 DIAGNOSIS — Z01.419 ENCOUNTER FOR GYNECOLOGICAL EXAMINATION WITHOUT ABNORMAL FINDING: Primary | ICD-10-CM

## 2019-04-02 PROBLEM — N94.6 DYSMENORRHEA: Status: RESOLVED | Noted: 2018-09-10 | Resolved: 2019-04-02

## 2019-04-02 PROBLEM — R10.32 LLQ PAIN: Status: RESOLVED | Noted: 2018-09-28 | Resolved: 2019-04-02

## 2019-04-02 PROBLEM — R10.32 LEFT LOWER QUADRANT PAIN: Status: RESOLVED | Noted: 2018-09-27 | Resolved: 2019-04-02

## 2019-04-02 PROCEDURE — S0612 ANNUAL GYNECOLOGICAL EXAMINA: HCPCS | Performed by: OBSTETRICS & GYNECOLOGY

## 2019-04-02 PROCEDURE — G0145 SCR C/V CYTO,THINLAYER,RESCR: HCPCS | Performed by: OBSTETRICS & GYNECOLOGY

## 2019-04-02 PROCEDURE — 87624 HPV HI-RISK TYP POOLED RSLT: CPT | Performed by: OBSTETRICS & GYNECOLOGY

## 2019-04-04 LAB
HPV HR 12 DNA CVX QL NAA+PROBE: NEGATIVE
HPV16 DNA CVX QL NAA+PROBE: NEGATIVE
HPV18 DNA CVX QL NAA+PROBE: NEGATIVE

## 2019-04-05 LAB
LAB AP GYN PRIMARY INTERPRETATION: NORMAL
LAB AP LMP: NORMAL
Lab: NORMAL

## 2019-04-10 ENCOUNTER — TELEPHONE (OUTPATIENT)
Dept: OBGYN CLINIC | Facility: CLINIC | Age: 39
End: 2019-04-10

## 2019-09-09 ENCOUNTER — HOSPITAL ENCOUNTER (OUTPATIENT)
Dept: RADIOLOGY | Facility: MEDICAL CENTER | Age: 39
Discharge: HOME | End: 2019-09-09
Payer: COMMERCIAL

## 2019-09-09 DIAGNOSIS — N83.201 RIGHT OVARIAN CYST: ICD-10-CM

## 2019-09-09 PROCEDURE — 76856 US EXAM PELVIC COMPLETE: CPT

## 2019-09-09 PROCEDURE — 76830 TRANSVAGINAL US NON-OB: CPT

## 2019-09-13 ENCOUNTER — TELEPHONE (OUTPATIENT)
Dept: OBGYN CLINIC | Facility: CLINIC | Age: 39
End: 2019-09-13

## 2019-09-13 NOTE — TELEPHONE ENCOUNTER
----- Message from Shannan Pitt MD sent at 9/13/2019  3:14 PM EDT -----  Please call Frida Ashton- ovarian cysts have been stable  Two options are to continue to monitor with yearly ultrasound if asymptomatic or surgery to remove the ovary if having pain

## 2019-10-04 ENCOUNTER — HOSPITAL ENCOUNTER (OUTPATIENT)
Dept: MAMMOGRAPHY | Facility: CLINIC | Age: 39
Discharge: HOME | End: 2019-10-04
Payer: COMMERCIAL

## 2019-10-04 ENCOUNTER — TELEPHONE (OUTPATIENT)
Dept: OBGYN CLINIC | Facility: CLINIC | Age: 39
End: 2019-10-04

## 2019-10-04 VITALS — HEIGHT: 62 IN | BODY MASS INDEX: 27.6 KG/M2 | WEIGHT: 150 LBS

## 2019-10-04 DIAGNOSIS — Z12.31 ENCOUNTER FOR SCREENING MAMMOGRAM FOR MALIGNANT NEOPLASM OF BREAST: ICD-10-CM

## 2019-10-04 DIAGNOSIS — R92.8 ABNORMAL MAMMOGRAM: Primary | ICD-10-CM

## 2019-10-04 DIAGNOSIS — R92.2 DENSE BREAST TISSUE: ICD-10-CM

## 2019-10-04 PROCEDURE — 77067 SCR MAMMO BI INCL CAD: CPT

## 2019-10-04 PROCEDURE — 77063 BREAST TOMOSYNTHESIS BI: CPT

## 2019-10-04 NOTE — TELEPHONE ENCOUNTER
Spoke with Pt today via phone call  Pt informed that Radiology recommends additional imaging of right breast per Dr Yeny Watters review of recent breast imaging result  Radiology orders for Mammo diagnostic right w 3d & cad and US breast right limited completed in Epic today  Pt given phone number to "Central Scheduling" in order to schedule additional breast imaging    Radiology orders mailed today to Pt's mailing address in EHR per Pt's request

## 2019-10-04 NOTE — TELEPHONE ENCOUNTER
----- Message from Tru Wells MD sent at 10/4/2019 10:40 AM EDT -----  Please call Feliciano Yancey  Mammogram has been reviewed  Additional imaging is required

## 2019-10-11 ENCOUNTER — HOSPITAL ENCOUNTER (OUTPATIENT)
Dept: ULTRASOUND IMAGING | Facility: CLINIC | Age: 39
Discharge: HOME | End: 2019-10-11
Payer: COMMERCIAL

## 2019-10-11 ENCOUNTER — HOSPITAL ENCOUNTER (OUTPATIENT)
Dept: MAMMOGRAPHY | Facility: CLINIC | Age: 39
Discharge: HOME | End: 2019-10-11
Payer: COMMERCIAL

## 2019-10-11 VITALS — HEIGHT: 62 IN | BODY MASS INDEX: 27.6 KG/M2 | WEIGHT: 150 LBS

## 2019-10-11 DIAGNOSIS — R92.8 ABNORMAL MAMMOGRAM: ICD-10-CM

## 2019-10-11 PROCEDURE — 77065 DX MAMMO INCL CAD UNI: CPT

## 2019-10-11 PROCEDURE — 76642 ULTRASOUND BREAST LIMITED: CPT

## 2019-10-11 PROCEDURE — G0279 TOMOSYNTHESIS, MAMMO: HCPCS

## 2019-10-18 ENCOUNTER — OFFICE VISIT (OUTPATIENT)
Dept: URGENT CARE | Facility: MEDICAL CENTER | Age: 39
End: 2019-10-18
Payer: COMMERCIAL

## 2019-10-18 VITALS
WEIGHT: 154 LBS | OXYGEN SATURATION: 99 % | TEMPERATURE: 97.3 F | SYSTOLIC BLOOD PRESSURE: 106 MMHG | DIASTOLIC BLOOD PRESSURE: 78 MMHG | RESPIRATION RATE: 16 BRPM | HEART RATE: 78 BPM | HEIGHT: 62 IN | BODY MASS INDEX: 28.34 KG/M2

## 2019-10-18 DIAGNOSIS — J01.10 ACUTE NON-RECURRENT FRONTAL SINUSITIS: Primary | ICD-10-CM

## 2019-10-18 PROCEDURE — G0382 LEV 3 HOSP TYPE B ED VISIT: HCPCS | Performed by: PHYSICIAN ASSISTANT

## 2019-10-18 PROCEDURE — S9083 URGENT CARE CENTER GLOBAL: HCPCS | Performed by: PHYSICIAN ASSISTANT

## 2019-10-18 RX ORDER — FLUTICASONE PROPIONATE 50 MCG
1 SPRAY, SUSPENSION (ML) NASAL DAILY
Qty: 1 BOTTLE | Refills: 0 | Status: SHIPPED | OUTPATIENT
Start: 2019-10-18

## 2019-10-18 RX ORDER — AMOXICILLIN AND CLAVULANATE POTASSIUM 875; 125 MG/1; MG/1
1 TABLET, FILM COATED ORAL EVERY 12 HOURS SCHEDULED
Qty: 14 TABLET | Refills: 0 | Status: SHIPPED | OUTPATIENT
Start: 2019-10-18 | End: 2019-10-25

## 2019-10-18 NOTE — PROGRESS NOTES
[facility]        NAME: Trav Chong is a 35 y o  female  : 1980    MRN: [id number]  DATE: 2019  TIME: 1:02 PM    Assessment and Plan   Acute non-recurrent frontal sinusitis [J01 10]  1  Acute non-recurrent frontal sinusitis  amoxicillin-clavulanate (AUGMENTIN) 875-125 mg per tablet         Patient Instructions     Sinusitis  augmentin twice daily  flonase once a day  Follow up with PCP in 3-5 days  Proceed to  ER if symptoms worsen  Chief Complaint     Chief Complaint   Patient presents with    Cold Like Symptoms     had cold like sx that has seemed to go away, but having other sx    Earache     x 1 week, taking sudafed and tylenol    Nasal Congestion     x 1 week    Facial Pain     x 1 week         History of Present Illness       36 y/o female presents c/o runny nose, congestion, cough x 2 weeks those symptoms went away but developed sinus pressure/ pain x 1 week which has not improved with over the counter decongestants  Denies chest pain, SOB, fever, chills      Review of Systems   Review of Systems   Constitutional: Negative for activity change, appetite change, chills, diaphoresis, fatigue and fever  HENT: Positive for congestion, ear pain, rhinorrhea, sinus pressure and sinus pain  Negative for ear discharge, facial swelling, hearing loss, mouth sores, nosebleeds, postnasal drip, sneezing, sore throat and voice change  Respiratory: Positive for cough  Negative for apnea, choking, chest tightness, shortness of breath, wheezing and stridor  Cardiovascular: Negative            Current Medications       Current Outpatient Medications:     amoxicillin-clavulanate (AUGMENTIN) 875-125 mg per tablet, Take 1 tablet by mouth every 12 (twelve) hours for 7 days, Disp: 14 tablet, Rfl: 0    Current Allergies     Allergies as of 10/18/2019    (No Known Allergies)            The following portions of the patient's history were reviewed and updated as appropriate: allergies, current medications, past family history, past medical history, past social history, past surgical history and problem list      Past Medical History:   Diagnosis Date    Arthritis     Fibroadenoma     Psoriasis        Past Surgical History:   Procedure Laterality Date    BREAST BIOPSY Right 10/2018    benign     SECTION      OH COLONOSCOPY FLX DX W/COLLJ SPEC WHEN PFRMD N/A 10/11/2018    Procedure: EGD AND COLONOSCOPY;  Surgeon: Gonzalo Em MD;  Location: [facility]; Service: Gastroenterology    US GUIDED BREAST BIOPSY RIGHT COMPLETE Right 10/8/2018       Family History   Problem Relation Age of Onset    Endometrial cancer Mother     No Known Problems Sister     Endometrial cancer Maternal Grandmother     No Known Problems Half-Sister     Endometrial cancer Maternal Aunt     No Known Problems Maternal Aunt     No Known Problems Paternal Grandmother     Breast cancer Neg Hx     Colon cancer Neg Hx     Ovarian cancer Neg Hx          Medications have been verified  Objective   /78 (BP Location: Left arm, Patient Position: Sitting, Cuff Size: Standard)   Pulse 78   Temp (!) 97 3 °F (36 3 °C) (Temporal)   Resp 16   Ht 5' 2" (1 575 m)   Wt 69 9 kg (154 lb)   LMP 2019   SpO2 99%   BMI 28 17 kg/m²        Physical Exam     Physical Exam   Constitutional: She appears well-developed and well-nourished  No distress  HENT:   Head: Normocephalic and atraumatic  Right Ear: Hearing, tympanic membrane, external ear and ear canal normal    Left Ear: Hearing, tympanic membrane, external ear and ear canal normal    Nose: Rhinorrhea present  Left sinus exhibits maxillary sinus tenderness  Mouth/Throat: Uvula is midline, oropharynx is clear and moist and mucous membranes are normal    Neck: Normal range of motion  Neck supple  Cardiovascular: Normal rate, regular rhythm, normal heart sounds and intact distal pulses     Pulmonary/Chest: Effort normal and breath sounds normal  No stridor  No respiratory distress  She has no wheezes  She has no rales  She exhibits no tenderness  Lymphadenopathy:     She has cervical adenopathy  Skin: She is not diaphoretic

## 2019-10-18 NOTE — PATIENT INSTRUCTIONS
Sinusitis  augmentin twice daily  flonase once a day  Follow up with PCP in 3-5 days  Proceed to  ER if symptoms worsen  Rhinosinusitis   WHAT YOU NEED TO KNOW:   Rhinosinusitis (RS) is inflammation of your nose and sinuses  It commonly begins as a virus, often as a common cold  Viruses usually last 7 to 10 days and do not need treatment  When the virus does not get better on its own, you may have bacterial RS  This means that bacteria have begun to grow inside your sinuses  Acute RS lasts less than 4 weeks  Chronic RS lasts 12 weeks or more  Recurrent RS is when you have 4 or more episodes of RS in one year  DISCHARGE INSTRUCTIONS:   Return to the emergency department if:   · Your eye and eyelid are red, swollen, and painful  · You cannot open your eye  · You have double vision or you cannot see  · Your eyeball bulges out or you cannot move your eye  · You are more sleepy than normal or you notice changes in your ability to think, move, or talk  · You have a stiff neck, a fever, or a bad headache  · You have swelling of your forehead or scalp  Contact your healthcare provider if:   · Your symptoms are worse or do not improve after 3 to 5 days of treatment  · You have questions or concerns about your condition or care  Medicines: You may need any of the following:  · Acetaminophen  decreases pain and fever  It is available without a doctor's order  Ask how much to take and how often to take it  Follow directions  Acetaminophen can cause liver damage if not taken correctly  · NSAIDs , such as ibuprofen, help decrease swelling, pain, and fever  This medicine is available with or without a doctor's order  NSAIDs can cause stomach bleeding or kidney problems in certain people  If you take blood thinner medicine, always ask your healthcare provider if NSAIDs are safe for you  Always read the medicine label and follow directions      · Nasal steroid sprays  decrease inflammation in your nose and sinuses  · Decongestants  reduce swelling and drain mucus in the nose and sinuses  They may help you breathe easier  · Antihistamines  dry mucus in the nose and relieve sneezing  · Antibiotics  treat a bacterial infection and may be needed if your symptoms do not improve or they get worse  · Take your medicine as directed  Contact your healthcare provider if you think your medicine is not helping or if you have side effects  Tell him or her if you are allergic to any medicine  Keep a list of the medicines, vitamins, and herbs you take  Include the amounts, and when and why you take them  Bring the list or the pill bottles to follow-up visits  Carry your medicine list with you in case of an emergency  Self-care:   · Rinse your sinuses  Use a sinus rinse device to rinse your nasal passages with a saline (salt water) solution  This will help thin the mucus in your nose and rinse away pollen and dirt  It will also help reduce swelling so you can breathe normally  Ask your healthcare provider how often to do this  · Breathe in steam   Heat a bowl of water until you see steam  Lean over the bowl and make a tent over your head with a large towel  Breathe deeply for about 20 minutes  Be careful not to get too close to the steam or burn yourself  Do this 3 times a day  You can also breathe deeply when you take a hot shower  · Sleep with your head elevated  Place an extra pillow under your head before you go to sleep to help your sinuses drain  · Drink liquids as directed  Ask your healthcare provider how much liquid to drink each day and which liquids are best for you  Liquids will thin the mucus in your nose and help it drain  Avoid drinks that contain alcohol or caffeine  · Do not smoke, and avoid secondhand smoke  Nicotine and other chemicals in cigarettes and cigars can make your symptoms worse   Ask your healthcare provider for information if you currently smoke and need help to quit  E-cigarettes or smokeless tobacco still contain nicotine  Talk to your healthcare provider before you use these products  Follow up with your healthcare provider as directed: Follow up if your symptoms are worse or not better after 3 to 5 days of treatment  Write down your questions so you remember to ask them during your visits  © 2017 [address] Information is for End User's use only and may not be sold, redistributed or otherwise used for commercial purposes  All illustrations and images included in CareNotes® are the copyrighted property of [employer]  or Daren Archuleta  The above information is an  only  It is not intended as medical advice for individual conditions or treatments  Talk to your doctor, nurse or pharmacist before following any medical regimen to see if it is safe and effective for you

## 2020-10-16 ENCOUNTER — TELEPHONE (OUTPATIENT)
Dept: OBGYN CLINIC | Facility: CLINIC | Age: 40
End: 2020-10-16

## 2020-10-16 DIAGNOSIS — Z12.31 VISIT FOR SCREENING MAMMOGRAM: Primary | ICD-10-CM

## 2020-12-02 ENCOUNTER — AMB VIDEO VISIT (OUTPATIENT)
Dept: OTHER | Facility: HOSPITAL | Age: 40
End: 2020-12-02
Payer: COMMERCIAL

## 2020-12-02 PROCEDURE — 99201 PR OFFICE OUTPATIENT NEW 10 MINUTES: CPT | Performed by: FAMILY MEDICINE

## 2020-12-07 ENCOUNTER — HOSPITAL ENCOUNTER (OUTPATIENT)
Dept: MAMMOGRAPHY | Facility: CLINIC | Age: 40
Discharge: HOME | End: 2020-12-07
Payer: COMMERCIAL

## 2020-12-07 VITALS — BODY MASS INDEX: 28.34 KG/M2 | WEIGHT: 154 LBS | HEIGHT: 62 IN

## 2020-12-07 DIAGNOSIS — Z12.31 VISIT FOR SCREENING MAMMOGRAM: ICD-10-CM

## 2020-12-07 PROCEDURE — 77067 SCR MAMMO BI INCL CAD: CPT

## 2020-12-07 PROCEDURE — 77063 BREAST TOMOSYNTHESIS BI: CPT

## 2021-02-17 ENCOUNTER — ANNUAL EXAM (OUTPATIENT)
Dept: OBGYN CLINIC | Facility: CLINIC | Age: 41
End: 2021-02-17
Payer: COMMERCIAL

## 2021-02-17 VITALS — SYSTOLIC BLOOD PRESSURE: 124 MMHG | BODY MASS INDEX: 30 KG/M2 | WEIGHT: 164 LBS | DIASTOLIC BLOOD PRESSURE: 80 MMHG

## 2021-02-17 DIAGNOSIS — N83.201 RIGHT OVARIAN CYST: ICD-10-CM

## 2021-02-17 DIAGNOSIS — Z01.419 ENCOUNTER FOR GYNECOLOGICAL EXAMINATION WITHOUT ABNORMAL FINDING: Primary | ICD-10-CM

## 2021-02-17 DIAGNOSIS — Z12.31 ENCOUNTER FOR SCREENING MAMMOGRAM FOR MALIGNANT NEOPLASM OF BREAST: ICD-10-CM

## 2021-02-17 PROCEDURE — S0612 ANNUAL GYNECOLOGICAL EXAMINA: HCPCS | Performed by: OBSTETRICS & GYNECOLOGY

## 2021-02-17 NOTE — PROGRESS NOTES
Assessment/Plan:    Encounter for gynecological examination without abnormal finding  Pap/HPV current  Mammogram current    Right ovarian cyst - ultrasound given for follow up  Currently asymptomatic  Encourage healthy diet, exercise, Calcium 1200mg per day and at least 800 iu Vitamin D daily  Diagnoses and all orders for this visit:    Encounter for gynecological examination without abnormal finding    Encounter for screening mammogram for malignant neoplasm of breast  -     Mammo screening bilateral w 3d & cad; Future    Right ovarian cyst  -     US pelvis complete w transvaginal; Future          Subjective:      Patient ID: Salima Douglas is a 38 y o  female  Patient presents for a routine annual visit  Age of first period-13 Y/O  Last Pap Smear- 18 Neg-HPV  LMP-2/10  Birth control- had vasectomy  Mammogram-20 WNL    Non smoker  Social drinker  Currently sexually active  No concerns/questions for today's visit    Son is senior in high school - interested in sports medicine, deciding which school to go to  [address] son is in 8th grade - karate    No concerns  Gynecologic Exam  The patient's pertinent negatives include no genital itching, genital lesions, genital odor, genital rash, pelvic pain, vaginal bleeding or vaginal discharge  The patient is experiencing no pain  Pertinent negatives include no chills, constipation, diarrhea, fever, frequency, nausea, painful intercourse, sore throat, urgency or vomiting  Her menstrual history has been regular  The following portions of the patient's history were reviewed and updated as appropriate:   She  has a past medical history of Arthritis, Fibroadenoma, and Psoriasis    She   Patient Active Problem List    Diagnosis Date Noted    Encounter for gynecological examination without abnormal finding 2019    Lactose intolerance in adult 2018    Intramural and subserous leiomyoma of uterus 09/10/2018    Right ovarian cyst 09/10/2018    Psoriasis 2009     She  has a past surgical history that includes  section; US guided breast biopsy right complete (Right, 10/8/2018); pr colonoscopy flx dx w/collj spec when pfrmd (N/A, 10/11/2018); and Breast biopsy (Right, 10/2018)  Her family history includes Endometrial cancer in her maternal aunt, maternal grandmother, and mother; No Known Problems in her father, half-sister, maternal aunt, maternal grandfather, paternal grandfather, paternal grandmother, and sister  She  reports that she has never smoked  She has never used smokeless tobacco  She reports current alcohol use of about 2 0 standard drinks of alcohol per week  She reports that she does not use drugs  Current Outpatient Medications   Medication Sig Dispense Refill    fluticasone (FLONASE) 50 mcg/act nasal spray 1 spray into each nostril daily 1 Bottle 0     No current facility-administered medications for this visit  Current Outpatient Medications on File Prior to Visit   Medication Sig    fluticasone (FLONASE) 50 mcg/act nasal spray 1 spray into each nostril daily     No current facility-administered medications on file prior to visit  She has No Known Allergies       Review of Systems   Constitutional: Negative for activity change, appetite change, chills, fatigue and fever  HENT: Negative for rhinorrhea, sneezing and sore throat  Eyes: Negative for visual disturbance  Respiratory: Negative for cough, shortness of breath and wheezing  Cardiovascular: Negative for chest pain, palpitations and leg swelling  Gastrointestinal: Negative for abdominal distention, constipation, diarrhea, nausea and vomiting  Genitourinary: Negative for difficulty urinating, frequency, pelvic pain, urgency and vaginal discharge  Neurological: Negative for syncope and light-headedness           Objective:      /80   Wt 74 4 kg (164 lb)   LMP 02/10/2021   BMI 30 00 kg/m²          Physical Exam  Chest: Breasts: Breasts are symmetrical          Right: No inverted nipple, mass, nipple discharge, skin change or tenderness  Left: No inverted nipple, mass, nipple discharge, skin change or tenderness  Genitourinary:     Labia:         Right: No rash, tenderness, lesion or injury  Left: No rash, tenderness, lesion or injury  Vagina: Normal  No vaginal discharge, tenderness or bleeding  Cervix: No cervical motion tenderness, discharge or friability  Uterus: Not deviated, not enlarged, not fixed and not tender  Adnexa:         Right: No mass, tenderness or fullness  Left: No mass, tenderness or fullness  Neurological:      Mental Status: She is alert and oriented to person, place, and time

## 2021-02-17 NOTE — PATIENT INSTRUCTIONS
Wellness Visit for Adults   WHAT YOU NEED TO KNOW:   What is a wellness visit? A wellness visit is when you see your healthcare provider to get screened for health problems  Your healthcare provider will also give you advice on how to stay healthy  Write down your questions so you remember to ask them  Ask your healthcare provider how often you should have a wellness visit  What happens at a wellness visit? Your healthcare provider will ask about your health, and your family history of health problems  This includes high blood pressure, heart disease, and cancer  He or she will ask if you have symptoms that concern you, if you smoke, and about your mood  You may also be asked about your intake of medicines, supplements, food, and alcohol  Any of the following may be done:  · Your weight  will be checked  Your height may also be checked so your body mass index (BMI) can be calculated  Your BMI shows if you are at a healthy weight  · Your blood pressure  and heart rate will be checked  Your temperature may also be checked  · Blood and urine tests  may be done  Blood tests may be done to check your cholesterol levels  Abnormal cholesterol levels increase your risk for heart disease and stroke  You may also need a blood or urine test to check for diabetes if you are at increased risk  Urine tests may be done to look for signs of an infection or kidney disease  · A physical exam  includes checking your heartbeat and lungs with a stethoscope  Your healthcare provider may also check your skin to look for sun damage  · Screening tests  may be recommended  A screening test is done to check for diseases that may not cause symptoms  The screening tests you may need depend on your age, gender, family history, and lifestyle habits  For example, colorectal screening may be recommended if you are 48years old or older  What screening tests do I need if I am a woman?    · A Pap smear  is used to screen for cervical cancer  Pap smears are usually done every 3 to 5 years depending on your age  You may need them more often if you have had abnormal Pap smear test results in the past  Ask your healthcare provider how often you should have a Pap smear  · A mammogram  is an x-ray of your breasts to screen for breast cancer  Experts recommend mammograms every 2 years starting at age 48 years  You may need a mammogram at age 52 years or younger if you have an increased risk for breast cancer  Talk to your healthcare provider about when you should start having mammograms and how often you need them  What vaccines might I need? · Get an influenza vaccine  every year  The influenza vaccine protects you from the flu  Several types of viruses cause the flu  The viruses change over time, so new vaccines are made each year  · Get a tetanus-diphtheria (Td) booster vaccine  every 10 years  This vaccine protects you against tetanus and diphtheria  Tetanus is a severe infection that may cause painful muscle spasms and lockjaw  Diphtheria is a severe bacterial infection that causes a thick covering in the back of your mouth and throat  · Get a human papillomavirus (HPV) vaccine  if you are female and aged 23 to 32 or male 23 to 24 and never received it  This vaccine protects you from HPV infection  HPV is the most common infection spread by sexual contact  HPV may also cause vaginal, penile, and anal cancers  · Get a pneumococcal vaccine  if you are aged 72 years or older  The pneumococcal vaccine is an injection given to protect you from pneumococcal disease  Pneumococcal disease is an infection caused by pneumococcal bacteria  The infection may cause pneumonia, meningitis, or an ear infection  · Get a shingles vaccine  if you are 60 or older, even if you have had shingles before  The shingles vaccine is an injection to protect you from the varicella-zoster virus  This is the same virus that causes chickenpox   Shingles is a painful rash that develops in people who had chickenpox or have been exposed to the virus  How can I eat healthy? My Plate is a model for planning healthy meals  It shows the types and amounts of foods that should go on your plate  Fruits and vegetables make up about half of your plate, and grains and protein make up the other half  A serving of dairy is included on the side of your plate  The amount of calories and serving sizes you need depends on your age, gender, weight, and height  Examples of healthy foods are listed below:  · Eat a variety of vegetables  such as dark green, red, and orange vegetables  You can also include canned vegetables low in sodium (salt) and frozen vegetables without added butter or sauces  · Eat a variety of fresh fruits , canned fruit in 100% juice, frozen fruit, and dried fruit  · Include whole grains  At least half of the grains you eat should be whole grains  Examples include whole-wheat bread, wheat pasta, brown rice, and whole-grain cereals such as oatmeal     · Eat a variety of protein foods such as seafood (fish and shellfish), lean meat, and poultry without skin (turkey and chicken)  Examples of lean meats include pork leg, shoulder, or tenderloin, and beef round, sirloin, tenderloin, and extra lean ground beef  Other protein foods include eggs and egg substitutes, beans, peas, soy products, nuts, and seeds  · Choose low-fat dairy products such as skim or 1% milk or low-fat yogurt, cheese, and cottage cheese  · Limit unhealthy fats  such as butter, hard margarine, and shortening  How much exercise do I need? Exercise at least 30 minutes per day on most days of the week  Some examples of exercise include walking, biking, dancing, and swimming  You can also fit in more physical activity by taking the stairs instead of the elevator or parking farther away from stores  Include muscle strengthening activities 2 days each week   Regular exercise provides many health benefits  It helps you manage your weight, and decreases your risk for type 2 diabetes, heart disease, stroke, and high blood pressure  Exercise can also help improve your mood  Ask your healthcare provider about the best exercise plan for you  What are some general health and safety guidelines I should follow? · Do not smoke  Nicotine and other chemicals in cigarettes and cigars can cause lung damage  Ask your healthcare provider for information if you currently smoke and need help to quit  E-cigarettes or smokeless tobacco still contain nicotine  Talk to your healthcare provider before you use these products  · Limit alcohol  A drink of alcohol is 12 ounces of beer, 5 ounces of wine, or 1½ ounces of liquor  · Lose weight, if needed  Being overweight increases your risk of certain health conditions  These include heart disease, high blood pressure, type 2 diabetes, and certain types of cancer  · Protect your skin  Do not sunbathe or use tanning beds  Use sunscreen with a SPF 15 or higher  Apply sunscreen at least 15 minutes before you go outside  Reapply sunscreen every 2 hours  Wear protective clothing, hats, and sunglasses when you are outside  · Drive safely  Always wear your seatbelt  Make sure everyone in your car wears a seatbelt  A seatbelt can save your life if you are in an accident  Do not use your cell phone when you are driving  This could distract you and cause an accident  Pull over if you need to make a call or send a text message  · Practice safe sex  Use latex condoms if are sexually active and have more than one partner  Your healthcare provider may recommend screening tests for sexually transmitted infections (STIs)  · Wear helmets, lifejackets, and protective gear  Always wear a helmet when you ride a bike or motorcycle, go skiing, or play sports that could cause a head injury  Wear protective equipment when you play sports   Wear a lifejacket when you are on a boat or doing water sports  CARE AGREEMENT:   You have the right to help plan your care  Learn about your health condition and how it may be treated  Discuss treatment options with your healthcare providers to decide what care you want to receive  You always have the right to refuse treatment  The above information is an  only  It is not intended as medical advice for individual conditions or treatments  Talk to your doctor, nurse or pharmacist before following any medical regimen to see if it is safe and effective for you  © Copyright [address] Information is for End User's use only and may not be sold, redistributed or otherwise used for commercial purposes   All illustrations and images included in CareNotes® are the copyrighted property of [employer]  or [address] [place] [address]

## 2021-02-17 NOTE — ASSESSMENT & PLAN NOTE
Pap/HPV current  Mammogram current    Right ovarian cyst - ultrasound given for follow up  Currently asymptomatic  Encourage healthy diet, exercise, Calcium 1200mg per day and at least 800 iu Vitamin D daily

## 2021-05-11 ENCOUNTER — HOSPITAL ENCOUNTER (OUTPATIENT)
Dept: RADIOLOGY | Facility: MEDICAL CENTER | Age: 41
Discharge: HOME | End: 2021-05-11
Payer: COMMERCIAL

## 2021-05-11 DIAGNOSIS — N83.201 RIGHT OVARIAN CYST: ICD-10-CM

## 2021-05-11 PROCEDURE — 76856 US EXAM PELVIC COMPLETE: CPT

## 2021-05-11 PROCEDURE — 76830 TRANSVAGINAL US NON-OB: CPT

## 2021-05-17 ENCOUNTER — TELEPHONE (OUTPATIENT)
Dept: OBGYN CLINIC | Facility: CLINIC | Age: 41
End: 2021-05-17

## 2021-09-03 ENCOUNTER — IMMUNIZATIONS (OUTPATIENT)
Dept: FAMILY MEDICINE CLINIC | Facility: HOSPITAL | Age: 41
End: 2021-09-03

## 2021-09-03 DIAGNOSIS — Z23 ENCOUNTER FOR IMMUNIZATION: Primary | ICD-10-CM

## 2021-09-03 PROCEDURE — 91300 SARS-COV-2 / COVID-19 MRNA VACCINE (PFIZER-BIONTECH) 30 MCG: CPT

## 2021-09-03 PROCEDURE — 0001A SARS-COV-2 / COVID-19 MRNA VACCINE (PFIZER-BIONTECH) 30 MCG: CPT

## 2021-09-24 ENCOUNTER — IMMUNIZATIONS (OUTPATIENT)
Dept: FAMILY MEDICINE CLINIC | Facility: HOSPITAL | Age: 41
End: 2021-09-24

## 2021-09-24 DIAGNOSIS — Z23 ENCOUNTER FOR IMMUNIZATION: Primary | ICD-10-CM

## 2021-09-24 PROCEDURE — 0002A SARS-COV-2 / COVID-19 MRNA VACCINE (PFIZER-BIONTECH) 30 MCG: CPT

## 2021-09-24 PROCEDURE — 91300 SARS-COV-2 / COVID-19 MRNA VACCINE (PFIZER-BIONTECH) 30 MCG: CPT

## 2021-12-14 ENCOUNTER — AMB VIDEO VISIT (OUTPATIENT)
Dept: OTHER | Facility: HOSPITAL | Age: 41
End: 2021-12-14
Payer: COMMERCIAL

## 2021-12-14 DIAGNOSIS — N30.00 ACUTE CYSTITIS WITHOUT HEMATURIA: Primary | ICD-10-CM

## 2021-12-14 PROCEDURE — 99212 OFFICE O/P EST SF 10 MIN: CPT | Performed by: PHYSICIAN ASSISTANT

## 2021-12-14 RX ORDER — PHENAZOPYRIDINE HYDROCHLORIDE 200 MG/1
200 TABLET, FILM COATED ORAL
Qty: 6 TABLET | Refills: 0 | Status: SHIPPED | OUTPATIENT
Start: 2021-12-14 | End: 2021-12-16

## 2021-12-14 RX ORDER — NITROFURANTOIN 25; 75 MG/1; MG/1
100 CAPSULE ORAL 2 TIMES DAILY
Qty: 14 CAPSULE | Refills: 0 | Status: SHIPPED | OUTPATIENT
Start: 2021-12-14 | End: 2021-12-21

## 2022-02-22 ENCOUNTER — ANNUAL EXAM (OUTPATIENT)
Dept: OBGYN CLINIC | Facility: CLINIC | Age: 42
End: 2022-02-22
Payer: COMMERCIAL

## 2022-02-22 VITALS — DIASTOLIC BLOOD PRESSURE: 80 MMHG | BODY MASS INDEX: 32.56 KG/M2 | WEIGHT: 178 LBS | SYSTOLIC BLOOD PRESSURE: 118 MMHG

## 2022-02-22 DIAGNOSIS — Z01.419 ENCOUNTER FOR GYNECOLOGICAL EXAMINATION WITHOUT ABNORMAL FINDING: ICD-10-CM

## 2022-02-22 DIAGNOSIS — N83.201 RIGHT OVARIAN CYST: ICD-10-CM

## 2022-02-22 DIAGNOSIS — Z01.419 ENCOUNTER FOR ANNUAL ROUTINE GYNECOLOGICAL EXAMINATION: Primary | ICD-10-CM

## 2022-02-22 DIAGNOSIS — Z12.31 SCREENING MAMMOGRAM, ENCOUNTER FOR: ICD-10-CM

## 2022-02-22 PROCEDURE — S0612 ANNUAL GYNECOLOGICAL EXAMINA: HCPCS | Performed by: OBSTETRICS & GYNECOLOGY

## 2022-02-22 NOTE — ASSESSMENT & PLAN NOTE
Pap/HPV collected  Mammogram ordered    Right ovarian cyst/fibroids - plan for ultrasound surveilance    Encourage healthy diet, exercise, Calcium 1200mg per day and at least 800 iu Vitamin D daily

## 2022-02-22 NOTE — PROGRESS NOTES
Assessment/Plan:    Encounter for gynecological examination without abnormal finding  Pap/HPV collected  Mammogram ordered    Right ovarian cyst/fibroids - plan for ultrasound surveilance    Encourage healthy diet, exercise, Calcium 1200mg per day and at least 800 iu Vitamin D daily  Diagnoses and all orders for this visit:    Encounter for annual routine gynecological examination    Screening mammogram, encounter for  -     Mammo screening bilateral w 3d & cad; Future    Right ovarian cyst  -     US pelvis complete w transvaginal; Future    Encounter for gynecological examination without abnormal finding          Subjective:      Patient ID: Abby Colón is a 42 y o  female  Patient presents for a routine annual visit  Menarche-11 Y/O  Last Pap Smear- 4/219 neg-HPV  LMP-2/1  Birth control-Vasectomy  Mammogram-3/11/20 wnl  [id number]  Non smoker  Social drinker  Currently sexually active  Family history of endometrial cancer  No concerns/questions for today's visit    Sons are doing well  Oldest in college    No changes in menses  Regular  Lasting 5-6 days + dysmenorrhea      Gynecologic Exam  The patient's pertinent negatives include no genital itching, genital lesions, genital odor, genital rash, pelvic pain, vaginal bleeding or vaginal discharge  The patient is experiencing no pain  Pertinent negatives include no chills, constipation, diarrhea, fever, frequency, nausea, painful intercourse, sore throat, urgency or vomiting  Her menstrual history has been regular  The following portions of the patient's history were reviewed and updated as appropriate:   She  has a past medical history of Arthritis, Fibroadenoma, and Psoriasis    She   Patient Active Problem List    Diagnosis Date Noted    Encounter for gynecological examination without abnormal finding 04/02/2019    Lactose intolerance in adult 09/27/2018    Intramural and subserous leiomyoma of uterus 09/10/2018    Right ovarian cyst 09/10/2018    Psoriasis 2009     She  has a past surgical history that includes  section; US guided breast biopsy right complete (Right, 10/8/2018); pr colonoscopy flx dx w/collj spec when pfrmd (N/A, 10/11/2018); and Breast biopsy (Right, 10/2018)  Her family history includes Endometrial cancer in her maternal aunt, maternal grandmother, and mother; No Known Problems in her father, half-sister, maternal aunt, maternal grandfather, paternal grandfather, paternal grandmother, and sister  She  reports that she has never smoked  She has never used smokeless tobacco  She reports current alcohol use of about 2 0 standard drinks of alcohol per week  She reports that she does not use drugs  Current Outpatient Medications   Medication Sig Dispense Refill    fluticasone (FLONASE) 50 mcg/act nasal spray 1 spray into each nostril daily 1 Bottle 0     No current facility-administered medications for this visit  Current Outpatient Medications on File Prior to Visit   Medication Sig    fluticasone (FLONASE) 50 mcg/act nasal spray 1 spray into each nostril daily     No current facility-administered medications on file prior to visit  She has No Known Allergies       Review of Systems   Constitutional: Negative for activity change, appetite change, chills, fatigue and fever  HENT: Negative for rhinorrhea, sneezing and sore throat  Eyes: Negative for visual disturbance  Respiratory: Negative for cough, shortness of breath and wheezing  Cardiovascular: Negative for chest pain, palpitations and leg swelling  Gastrointestinal: Negative for abdominal distention, constipation, diarrhea, nausea and vomiting  Genitourinary: Negative for difficulty urinating, frequency, pelvic pain, urgency and vaginal discharge  Neurological: Negative for syncope and light-headedness           Objective:      /80   Wt 80 7 kg (178 lb)   LMP 2022   BMI 32 56 kg/m²          Physical Exam  Chest: Breasts: Breasts are symmetrical       Right: No inverted nipple, mass, nipple discharge, skin change or tenderness  Left: No inverted nipple, mass, nipple discharge, skin change or tenderness  Genitourinary:     Labia:         Right: No rash, tenderness, lesion or injury  Left: No rash, tenderness, lesion or injury  Vagina: Normal  No vaginal discharge, tenderness or bleeding  Cervix: No cervical motion tenderness, discharge or friability  Uterus: Enlarged (12 week bulky)  Not deviated, not fixed and not tender  Adnexa:         Right: No mass, tenderness or fullness  Left: No mass, tenderness or fullness  Neurological:      Mental Status: She is alert and oriented to person, place, and time

## 2022-02-22 NOTE — PATIENT INSTRUCTIONS
Menopause   WHAT YOU NEED TO KNOW:   What is menopause? Menopause is a normal stage in a woman's life when her monthly periods stop  A woman who has not had a period for a full year after the age of 36 is considered to be in menopause  Menopause usually occurs between ages 52 to 48  Perimenopause is a stage before menopause that may cause signs and symptoms similar to menopause  Perimenopause may start about 4 years before menopause  What causes menopause? Menopause starts when the ovaries stop making the female hormones estrogen and progesterone  After menopause, a woman is no longer able to become pregnant  Any of the following may trigger menopause or early menopause:  · Older age    · Surgery, including a hysterectomy or oophorectomy    · Family history of early menopause    · Smoking    · Chemotherapy or pelvic radiation    · Chromosome abnormalities, including Allan syndrome and Fragile X syndrome    What are the signs and symptoms of menopause? The signs and symptoms of menopause can be different from woman to woman:  · Irregular menstrual cycles with heavy vaginal bleeding followed by decreased bleeding until it stops    · Hot flashes (feeling warm, flushed, and sweaty)     · Vaginal changes such as increased dryness     · Mood changes such as anxiety, depression, or decreased desire to have sex    · Trouble sleeping, joint pain, headaches    · Brittle nails, hair on chin or chest where it is normally absent    · Decrease in breast size and change in skin texture    What do I need to know about menopause? · You can still get pregnant while you have periods  Continue to use birth control if you do not want to get pregnant  You may need to use birth control until it has been 1 year since your periods stopped  · Hormone replacement therapy (HRT) can be used to treat symptoms of menopause  HRT is medicine that replaces your low hormone levels  HRT contains estrogen and sometimes progestin   HRT has benefits and risks  HRT decreases your risk for bone fractures by helping to prevent osteoporosis  HRT also protects you from colon cancer  HRT may increase your risk for breast cancer, blood clots, heart disease, and stroke  Ask your healthcare provider if HRT is right for you  How can I care for myself? · Manage hot flashes  Hot flashes are brief periods of feeling very warm, flushed, and sweaty  Hot flashes can last from a few seconds to several minutes  They may happen many times during the day, and are common at night  Layer your clothing so that you can easily remove some clothing and cool yourself during a hot flash  Cold drinks may also be helpful  · Reduce vaginal dryness  by using over-the-counter vaginal creams  Vaginal dryness may cause you to have pain or discomfort during sex  Only use creams that are made for vaginal use  Do  not  use petroleum jelly  You may need an estrogen cream to put in and around your vagina  Estrogen cream may help decrease vaginal dryness and lower your risk of vaginal infections  · Continue to use birth control  during perimenopause if you do not want to get pregnant  You may need to use birth control until it has been 1 year since your periods stopped  Ask your healthcare provider when you can stop using birth control to prevent pregnancy  How can I live a healthy lifestyle during and after menopause? After menopause, your risk for heart disease and bone loss increases  Ask about these and other ways to stay healthy:  · Exercise regularly  Exercise helps you maintain a healthy weight  Exercise can also help to control your blood pressure and cholesterol levels  Include weight-bearing exercise for strong bones  Weight bearing exercise is recommended for at least 30 minutes, 3 times a week  Ask your healthcare provider about the best exercise plan for you  · Eat a variety of healthy foods    Include fruits, vegetables, whole grains (whole-wheat bread, pasta, and cereals), low-fat dairy, and lean protein foods (beans, poultry, and fish)  Limit foods high in sodium (salt)  Ask your healthcare provider for more information about a meal plan that is right for you  · Do not smoke  If you smoke, it is never too late to quit  You are more likely to have a heart attack, lung disease, blood clots, and cancer if you smoke  Ask your healthcare provider for information if you need help quitting  · Take supplements as directed  You may need extra calcium and vitamin D to help prevent osteoporosis  · Limit alcohol and caffeine  Alcohol and caffeine may worsen your symptoms  When should I contact my healthcare provider? · You have vaginal bleeding after menopause  · You have questions or concerns about your condition or care  CARE AGREEMENT:   You have the right to help plan your care  Learn about your health condition and how it may be treated  Discuss treatment options with your healthcare providers to decide what care you want to receive  You always have the right to refuse treatment  The above information is an  only  It is not intended as medical advice for individual conditions or treatments  Talk to your doctor, nurse or pharmacist before following any medical regimen to see if it is safe and effective for you  © Copyright "[employer] 2021 Information is for End User's use only and may not be sold, redistributed or otherwise used for commercial purposes   All illustrations and images included in CareNotes® are the copyrighted property of [employer] , [employer]  or [address] [place] [address] [facility]

## 2022-07-07 ENCOUNTER — HOSPITAL ENCOUNTER (OUTPATIENT)
Dept: ULTRASOUND IMAGING | Facility: CLINIC | Age: 42
Discharge: HOME | End: 2022-07-07
Payer: COMMERCIAL

## 2022-07-07 DIAGNOSIS — N83.201 RIGHT OVARIAN CYST: ICD-10-CM

## 2022-07-07 PROCEDURE — 76830 TRANSVAGINAL US NON-OB: CPT

## 2022-07-07 PROCEDURE — 76856 US EXAM PELVIC COMPLETE: CPT

## 2022-07-12 ENCOUNTER — HOSPITAL ENCOUNTER (OUTPATIENT)
Dept: MAMMOGRAPHY | Facility: CLINIC | Age: 42
Discharge: HOME | End: 2022-07-12
Payer: COMMERCIAL

## 2022-07-12 VITALS — BODY MASS INDEX: 32.76 KG/M2 | HEIGHT: 62 IN | WEIGHT: 178 LBS

## 2022-07-12 DIAGNOSIS — Z12.31 SCREENING MAMMOGRAM, ENCOUNTER FOR: ICD-10-CM

## 2022-07-12 PROCEDURE — 77067 SCR MAMMO BI INCL CAD: CPT

## 2022-07-12 PROCEDURE — 77063 BREAST TOMOSYNTHESIS BI: CPT

## 2022-07-15 ENCOUNTER — TELEPHONE (OUTPATIENT)
Dept: OBGYN CLINIC | Facility: CLINIC | Age: 42
End: 2022-07-15

## 2022-07-15 DIAGNOSIS — N83.201 RIGHT OVARIAN CYST: Primary | ICD-10-CM

## 2022-07-15 NOTE — TELEPHONE ENCOUNTER
----- Message from Angela Pennington People sent at 7/15/2022  7:36 AM EDT -----  Regarding: Ultrasound results  Can we please disuss my most recent ultrasound results  Is additional imaging needed?

## 2022-09-07 ENCOUNTER — TELEPHONE (OUTPATIENT)
Dept: OBGYN CLINIC | Facility: CLINIC | Age: 42
End: 2022-09-07

## 2022-09-19 ENCOUNTER — HOSPITAL ENCOUNTER (OUTPATIENT)
Dept: ULTRASOUND IMAGING | Facility: CLINIC | Age: 42
Discharge: HOME | End: 2022-09-19
Payer: COMMERCIAL

## 2022-09-19 DIAGNOSIS — N83.201 RIGHT OVARIAN CYST: ICD-10-CM

## 2022-09-19 PROCEDURE — 76856 US EXAM PELVIC COMPLETE: CPT

## 2022-09-19 PROCEDURE — 76830 TRANSVAGINAL US NON-OB: CPT

## 2022-09-21 DIAGNOSIS — N83.201 RIGHT OVARIAN CYST: Primary | ICD-10-CM

## 2022-12-19 ENCOUNTER — HOSPITAL ENCOUNTER (OUTPATIENT)
Dept: ULTRASOUND IMAGING | Facility: CLINIC | Age: 42
Discharge: HOME | End: 2022-12-19

## 2022-12-19 DIAGNOSIS — N83.201 RIGHT OVARIAN CYST: ICD-10-CM

## 2022-12-28 DIAGNOSIS — N83.201 RIGHT OVARIAN CYST: Primary | ICD-10-CM

## 2023-02-28 NOTE — ASSESSMENT & PLAN NOTE
Pap/HPV current  Mammogram ordered        Encourage healthy diet, exercise, Calcium 1200mg per day and at least 800 iu Vitamin D daily

## 2023-03-01 ENCOUNTER — ANNUAL EXAM (OUTPATIENT)
Dept: OBGYN CLINIC | Facility: CLINIC | Age: 43
End: 2023-03-01

## 2023-03-01 VITALS
SYSTOLIC BLOOD PRESSURE: 114 MMHG | HEIGHT: 62 IN | BODY MASS INDEX: 33.01 KG/M2 | DIASTOLIC BLOOD PRESSURE: 78 MMHG | WEIGHT: 179.4 LBS

## 2023-03-01 DIAGNOSIS — Z01.419 ENCOUNTER FOR GYNECOLOGICAL EXAMINATION WITHOUT ABNORMAL FINDING: Primary | ICD-10-CM

## 2023-03-01 DIAGNOSIS — N83.201 RIGHT OVARIAN CYST: ICD-10-CM

## 2023-03-01 RX ORDER — CHLORAL HYDRATE 500 MG
2 CAPSULE ORAL DAILY
COMMUNITY

## 2023-03-01 RX ORDER — DIPHENOXYLATE HYDROCHLORIDE AND ATROPINE SULFATE 2.5; .025 MG/1; MG/1
1 TABLET ORAL DAILY
COMMUNITY

## 2023-03-01 NOTE — PROGRESS NOTES
Assessment/Plan:    Encounter for gynecological examination without abnormal finding  Pap/HPV current  Mammogram ordered        Encourage healthy diet, exercise, Calcium 1200mg per day and at least 800 iu Vitamin D daily  Right ovarian cyst  Follow up imaging ordered for yearly screening  Symptoms minimal at this time       Diagnoses and all orders for this visit:    Encounter for gynecological examination without abnormal finding    Right ovarian cyst  -     US pelvis complete w transvaginal; Future    Other orders  -     multivitamin (THERAGRAN) TABS; 1 tablet daily  -     Omega-3 1000 MG CAPS; Take 2 g by mouth daily          Subjective:      Patient ID: Ashley Or is a 39 y o  female  Patient presents for a routine annual visit  Menarche- Y/O  Last Pap Smear- 4/2/19 neg/neg  LMP-2/18/23  Birth control-vasectomy  Mammogram-7/12/22  [id number]  Non smoker  Social drinker  Currently sexually active  Mother, MGM and Maternal aunt with breast cancer     No concerns/questions for today's visit  Menses regular heavy but tolerable  No cyst pain as felt before  Sons doing well oldest is at [employer], youngest sophomore high school  Gynecologic Exam  She reports no genital itching, genital lesions, genital odor, genital rash, pelvic pain, vaginal bleeding or vaginal discharge  The patient is experiencing no pain  Pertinent negatives include no chills, constipation, diarrhea, fever, nausea, sore throat or vomiting  The following portions of the patient's history were reviewed and updated as appropriate:   She  has a past medical history of Arthritis, Fibroadenoma, Fibroid (8/2018), Miscarriage (12/2005), and Psoriasis    She   Patient Active Problem List    Diagnosis Date Noted   • Encounter for gynecological examination without abnormal finding 04/02/2019   • Lactose intolerance in adult 09/27/2018   • Intramural and subserous leiomyoma of uterus 09/10/2018   • Right ovarian cyst 09/10/2018   • Psoriasis 2009     She  has a past surgical history that includes  section; US guided breast biopsy right complete (Right, 10/8/2018); pr colonoscopy flx dx w/collj spec when pfrmd (N/A, 10/11/2018); and Breast biopsy (Right, 10/2018)  Her family history includes Cancer in her maternal grandmother and mother; Endometrial cancer in her maternal aunt, maternal grandmother, and mother; Heart disease in her father; Hypertension in her father; No Known Problems in her half-sister, maternal aunt, maternal grandfather, paternal grandfather, paternal grandmother, and sister  She  reports that she has never smoked  She has never used smokeless tobacco  She reports current alcohol use of about 2 0 standard drinks per week  She reports that she does not use drugs  Current Outpatient Medications   Medication Sig Dispense Refill   • multivitamin (THERAGRAN) TABS 1 tablet daily     • Omega-3 1000 MG CAPS Take 2 g by mouth daily       No current facility-administered medications for this visit  Current Outpatient Medications on File Prior to Visit   Medication Sig   • multivitamin (THERAGRAN) TABS 1 tablet daily   • Omega-3 1000 MG CAPS Take 2 g by mouth daily   • [DISCONTINUED] fluticasone (FLONASE) 50 mcg/act nasal spray 1 spray into each nostril daily     No current facility-administered medications on file prior to visit  She has No Known Allergies       Review of Systems   Constitutional: Negative for activity change, appetite change, chills, fatigue and fever  HENT: Negative for rhinorrhea, sneezing and sore throat  Eyes: Negative for visual disturbance  Respiratory: Negative for cough, shortness of breath and wheezing  Cardiovascular: Negative for chest pain, palpitations and leg swelling  Gastrointestinal: Negative for abdominal distention, constipation, diarrhea, nausea and vomiting  Genitourinary: Negative for difficulty urinating, pelvic pain and vaginal discharge  Neurological: Negative for syncope and light-headedness  Objective:      /78 (BP Location: Left arm, Patient Position: Sitting, Cuff Size: Standard)   Ht 5' 2" (1 575 m)   Wt 81 4 kg (179 lb 6 4 oz)   LMP 02/18/2023 (Approximate)   BMI 32 81 kg/m²          Physical Exam  Chest:   Breasts:     Breasts are symmetrical       Right: No inverted nipple, mass, nipple discharge, skin change or tenderness  Left: No inverted nipple, mass, nipple discharge, skin change or tenderness  Genitourinary:     Labia:         Right: No rash, tenderness, lesion or injury  Left: No rash, tenderness, lesion or injury  Vagina: Normal  No vaginal discharge, tenderness or bleeding  Cervix: No cervical motion tenderness, discharge or friability  Uterus: Enlarged (12 weeks)  Not deviated, not fixed and not tender  Adnexa:         Right: No mass, tenderness or fullness  Left: No mass, tenderness or fullness  Neurological:      Mental Status: She is alert and oriented to person, place, and time

## 2023-07-07 ENCOUNTER — TELEPHONE (OUTPATIENT)
Dept: OBGYN CLINIC | Facility: CLINIC | Age: 43
End: 2023-07-07

## 2023-07-07 DIAGNOSIS — Z12.31 VISIT FOR SCREENING MAMMOGRAM: Primary | ICD-10-CM

## 2023-07-18 DIAGNOSIS — Z30.09 ENCOUNTER FOR GENERAL COUNSELING ON PRESCRIPTION OF ORAL CONTRACEPTIVES: ICD-10-CM

## 2023-07-18 DIAGNOSIS — Z30.09 ENCOUNTER FOR GENERAL COUNSELING ON PRESCRIPTION OF ORAL CONTRACEPTIVES: Primary | ICD-10-CM

## 2023-07-18 DIAGNOSIS — N92.4 ABNORMAL PERIMENOPAUSAL BLEEDING: ICD-10-CM

## 2023-07-18 RX ORDER — NORETHINDRONE ACETATE AND ETHINYL ESTRADIOL, AND FERROUS FUMARATE 1MG-20(24)
1 KIT ORAL DAILY
Qty: 30 TABLET | Refills: 11 | Status: SHIPPED | OUTPATIENT
Start: 2023-07-18

## 2023-08-17 ENCOUNTER — TELEPHONE (OUTPATIENT)
Dept: OBGYN CLINIC | Facility: CLINIC | Age: 43
End: 2023-08-17

## 2023-08-17 DIAGNOSIS — N92.6 IRREGULAR MENSES: Primary | ICD-10-CM

## 2023-08-17 DIAGNOSIS — N83.201 CYST OF RIGHT OVARY: ICD-10-CM

## 2023-08-17 NOTE — TELEPHONE ENCOUNTER
Regarding: FW: Ultrasound  Contact: [phone]  Please order ultrasound. Pain can be from fibroids or from ovaries. The pill should ultimately reduce this but the ultrasound will help evaluate her anatomy to see if there have been any changes. ----- Message -----  From: Jay Duffy RN  Sent: 8/16/2023   3:21 PM EDT  To: Tracy Oswald MD  Subject: Ultrasound                                       ----- Message from Jay Duffy RN sent at 8/16/2023  3:21 PM EDT -----       ----- Message from Elijah Larios to Tracy Oswald MD sent at 8/16/2023  3:06 PM -----   The pain this cycle was significantly worse than usual. I've had days of bloating, pain if I apply pressure to my lower abdomen, lower back pain, pain shooting down my thigh. This isn't usual for me. The pill seems to help my moods and reduce the flow but I'm wondering if there has been some significant change to my fibroids that is causing the increase in pain. Should I make an appointment or should we do an ultrasound to see if there has been significant change or could this be a side affect of the pill?

## 2023-09-01 ENCOUNTER — HOSPITAL ENCOUNTER (OUTPATIENT)
Dept: ULTRASOUND IMAGING | Facility: CLINIC | Age: 43
Discharge: HOME | End: 2023-09-01
Payer: COMMERCIAL

## 2023-09-01 DIAGNOSIS — N83.201 RIGHT OVARIAN CYST: ICD-10-CM

## 2023-09-01 PROCEDURE — 76856 US EXAM PELVIC COMPLETE: CPT

## 2023-09-01 PROCEDURE — 76830 TRANSVAGINAL US NON-OB: CPT

## 2023-09-08 DIAGNOSIS — N83.201 CYST OF RIGHT OVARY: Primary | ICD-10-CM

## 2023-10-19 ENCOUNTER — HOSPITAL ENCOUNTER (OUTPATIENT)
Dept: MAMMOGRAPHY | Facility: CLINIC | Age: 43
End: 2023-10-19
Payer: COMMERCIAL

## 2023-10-19 VITALS — WEIGHT: 165 LBS | BODY MASS INDEX: 30.36 KG/M2 | HEIGHT: 62 IN

## 2023-10-19 DIAGNOSIS — Z12.31 VISIT FOR SCREENING MAMMOGRAM: ICD-10-CM

## 2023-10-19 PROCEDURE — 77063 BREAST TOMOSYNTHESIS BI: CPT

## 2023-10-19 PROCEDURE — 77067 SCR MAMMO BI INCL CAD: CPT

## 2023-11-20 ENCOUNTER — OFFICE VISIT (OUTPATIENT)
Age: 43
End: 2023-11-20
Payer: COMMERCIAL

## 2023-11-20 VITALS
OXYGEN SATURATION: 100 % | TEMPERATURE: 98 F | WEIGHT: 168.2 LBS | RESPIRATION RATE: 18 BRPM | SYSTOLIC BLOOD PRESSURE: 130 MMHG | DIASTOLIC BLOOD PRESSURE: 82 MMHG | HEART RATE: 74 BPM | BODY MASS INDEX: 30.76 KG/M2

## 2023-11-20 DIAGNOSIS — R09.82 POST-NASAL DRAINAGE: ICD-10-CM

## 2023-11-20 DIAGNOSIS — R05.1 ACUTE COUGH: Primary | ICD-10-CM

## 2023-11-20 PROCEDURE — 99213 OFFICE O/P EST LOW 20 MIN: CPT | Performed by: PHYSICIAN ASSISTANT

## 2023-11-20 RX ORDER — PREDNISONE 20 MG/1
TABLET ORAL
Qty: 15 TABLET | Refills: 0 | Status: SHIPPED | OUTPATIENT
Start: 2023-11-20

## 2023-11-20 RX ORDER — BENZONATATE 200 MG/1
200 CAPSULE ORAL 3 TIMES DAILY PRN
Qty: 20 CAPSULE | Refills: 0 | Status: SHIPPED | OUTPATIENT
Start: 2023-11-20

## 2023-11-20 RX ORDER — PSEUDOEPHEDRINE HCL 120 MG/1
120 TABLET, FILM COATED, EXTENDED RELEASE ORAL 2 TIMES DAILY
Qty: 10 TABLET | Refills: 0 | Status: SHIPPED | OUTPATIENT
Start: 2023-11-20

## 2023-11-20 RX ORDER — HALOBETASOL PROPIONATE 0.05 %
OINTMENT (GRAM) TOPICAL
COMMUNITY
Start: 2023-10-18

## 2023-11-20 NOTE — PATIENT INSTRUCTIONS
Acute Cough   WHAT YOU NEED TO KNOW:   An acute cough can last up to 3 weeks. Common causes of an acute cough include a cold, allergies, or a lung infection. DISCHARGE INSTRUCTIONS:   Return to the emergency department if:   You have trouble breathing or feel short of breath. You cough up blood, or you see blood in your mucus. You faint or feel weak or dizzy. You have chest pain when you cough or take a deep breath. You have new wheezing. Contact your healthcare provider if:   You have a fever. Your cough lasts longer than 4 weeks. Your symptoms do not improve with treatment. You have questions or concerns about your condition or care. Medicines:   Medicines  may be needed to stop the cough, decrease swelling in your airways, or help open your airways. Medicine may also be given to help you cough up mucus. Ask your healthcare provider what over-the-counter medicines you can take. If you have an infection caused by bacteria, you may need antibiotics. Take your medicine as directed. Contact your healthcare provider if you think your medicine is not helping or if you have side effects. Tell your provider if you are allergic to any medicine. Keep a list of the medicines, vitamins, and herbs you take. Include the amounts, and when and why you take them. Bring the list or the pill bottles to follow-up visits. Carry your medicine list with you in case of an emergency. Manage your symptoms:   Do not smoke and stay away from others who smoke. Nicotine and other chemicals in cigarettes and cigars can cause lung damage and make your cough worse. Ask your healthcare provider for information if you currently smoke and need help to quit. E-cigarettes or smokeless tobacco still contain nicotine. Talk to your healthcare provider before you use these products. Drink extra liquids as directed. Liquids will help thin and loosen mucus so you can cough it up.  Liquids will also help prevent dehydration. Examples of good liquids to drink include water, fruit juice, and broth. Do not drink liquids that contain caffeine. Caffeine can increase your risk for dehydration. Ask your healthcare provider how much liquid to drink each day. Rest as directed. Do not do activities that make your cough worse, such as exercise. Use a humidifier or vaporizer. Use a cool mist humidifier or a vaporizer to increase air moisture in your home. This may make it easier for you to breathe and help decrease your cough. Eat 2 to 5 mL of honey 2 times each day. Honey can help thin mucus and decrease your cough. Use cough drops or lozenges. These can help decrease throat irritation and your cough. Follow up with your healthcare provider as directed:  Write down your questions so you remember to ask them during your visits. © Copyright [employer] Myles 2023 Information is for End User's use only and may not be sold, redistributed or otherwise used for commercial purposes. The above information is an  only. It is not intended as medical advice for individual conditions or treatments. Talk to your doctor, nurse or pharmacist before following any medical regimen to see if it is safe and effective for you.

## 2023-11-20 NOTE — PROGRESS NOTES
[facility]        NAME: Andry Rod is a 39 y.o. female  : 1980    MRN: [id number]  DATE: 2023  TIME: 6:45 PM    Assessment and Plan   Acute cough [R05.1]  1. Acute cough  predniSONE 20 mg tablet    benzonatate (TESSALON) 200 MG capsule      2. Post-nasal drainage  pseudoephedrine (SUDAFED) 120 MG 12 hr tablet            Patient Instructions     Prednisone as directed for 4 days  Start [employer] after your prednisone regiment and  Increase your fluid intake  Rest  Sudafed 120 mg designed to take twice a day however I like you to take it once daily    Follow up with PCP in 3-5 days. Proceed to  ER if symptoms worsen. Chief Complaint     Chief Complaint   Patient presents with    Cold Like Symptoms     Symptoms started  about a week ago. C/o cough,  loss of voice, yellow mucus. Otc taken. History of Present Illness       Cough  This is a new problem. The current episode started in the past 7 days. The problem has been unchanged. The problem occurs constantly. The cough is Productive of sputum. Associated symptoms include nasal congestion, postnasal drip and rhinorrhea. Pertinent negatives include no chills, fever, headaches, heartburn, hemoptysis, myalgias, rash, sore throat, shortness of breath or wheezing. The symptoms are aggravated by lying down. She has tried OTC cough suppressant for the symptoms. The treatment provided no relief. There is no history of asthma, COPD or environmental allergies. Review of Systems   Review of Systems   Constitutional:  Negative for chills and fever. HENT:  Positive for postnasal drip and rhinorrhea. Negative for sore throat. Respiratory:  Positive for cough. Negative for hemoptysis, shortness of breath and wheezing. Gastrointestinal:  Negative for heartburn. Musculoskeletal:  Negative for myalgias. Skin:  Negative for rash. Allergic/Immunologic: Negative for environmental allergies.    Neurological: Negative for headaches. Current Medications       Current Outpatient Medications:     benzonatate (TESSALON) 200 MG capsule, Take 1 capsule (200 mg total) by mouth 3 (three) times a day as needed for cough, Disp: 20 capsule, Rfl: 0    halobetasol (ULTRAVATE) 0.05 % ointment, APPLY TWICE A DAY TO THE AFFECTED AREAS X 1-2 WEEKS, THEN REDUCE TO THREE TIMES A WEEK (AVOID FACE), Disp: , Rfl:     multivitamin (THERAGRAN) TABS, 1 tablet daily, Disp: , Rfl:     norethindrone-ethinyl estradiol-ferrous fumarate (Esther 24 Fe) 1-20 MG-MCG(24) per tablet, Take 1 tablet by mouth daily, Disp: 30 tablet, Rfl: 11    Omega-3 1000 MG CAPS, Take 2 g by mouth daily, Disp: , Rfl:     predniSONE 20 mg tablet, 2 tabs by mouth daily at once for 4 days. , Disp: 15 tablet, Rfl: 0    pseudoephedrine (SUDAFED) 120 MG 12 hr tablet, Take 1 tablet (120 mg total) by mouth 2 (two) times a day, Disp: 10 tablet, Rfl: 0    Current Allergies     Allergies as of 2023    (No Known Allergies)            The following portions of the patient's history were reviewed and updated as appropriate: allergies, current medications, past family history, past medical history, past social history, past surgical history and problem list.     Past Medical History:   Diagnosis Date    Arthritis     Fibroadenoma     Fibroid 2018    Miscarriage 2005    Psoriasis        Past Surgical History:   Procedure Laterality Date    BREAST BIOPSY Right 10/2018    benign     SECTION      MT COLONOSCOPY FLX DX W/COLLJ SPEC WHEN PFRMD N/A 10/11/2018    Procedure: EGD AND COLONOSCOPY;  Surgeon: Kerline Jefferson MD;  Location: [facility];   Service: Gastroenterology    US GUIDED BREAST BIOPSY RIGHT COMPLETE Right 10/8/2018       Family History   Problem Relation Age of Onset    Endometrial cancer Mother     Cancer Mother         Endometrial    Heart disease Father     Hypertension Father     No Known Problems Sister     Endometrial cancer Maternal Grandmother Cancer Maternal Grandmother         Endometrial    No Known Problems Maternal Grandfather     No Known Problems Paternal Grandmother     No Known Problems Paternal Grandfather     Endometrial cancer Maternal Aunt     No Known Problems Maternal Aunt     No Known Problems Half-Sister     Breast cancer Neg Hx     Colon cancer Neg Hx     Ovarian cancer Neg Hx     Breast cancer additional onset Neg Hx     BRCA 1/2 Neg Hx     BRCA1 Negative Neg Hx     BRCA1 Positive Neg Hx     BRCA2 Negative Neg Hx     BRCA2 Positive Neg Hx          Medications have been verified. Objective   /82   Pulse 74   Temp 98 °F (36.7 °C)   Resp 18   Wt 76.3 kg (168 lb 3.2 oz)   SpO2 100%   BMI 30.76 kg/m²        Physical Exam     Physical Exam  Vitals and nursing note reviewed. Constitutional:       General: She is not in acute distress. Appearance: Normal appearance. She is well-developed. She is not ill-appearing. HENT:      Head: Normocephalic and atraumatic. Right Ear: Tympanic membrane, ear canal and external ear normal.      Left Ear: Tympanic membrane, ear canal and external ear normal.      Nose: Congestion and rhinorrhea present. Mouth/Throat:      Mouth: Mucous membranes are moist. No oral lesions. Pharynx: Oropharynx is clear. No oropharyngeal exudate or posterior oropharyngeal erythema. Eyes:      General: No scleral icterus. Extraocular Movements: Extraocular movements intact. Right eye: Normal extraocular motion. Left eye: Normal extraocular motion. Conjunctiva/sclera: Conjunctivae normal.      Pupils: Pupils are equal, round, and reactive to light. Cardiovascular:      Rate and Rhythm: Normal rate and regular rhythm. Pulses: Normal pulses. Heart sounds: Normal heart sounds. Pulmonary:      Effort: Pulmonary effort is normal. No respiratory distress. Breath sounds: Normal breath sounds. No wheezing or rhonchi.    Musculoskeletal:         General: Normal range of motion. Cervical back: Normal range of motion and neck supple. No tenderness. Lymphadenopathy:      Cervical: No cervical adenopathy. Skin:     General: Skin is warm and dry. Capillary Refill: Capillary refill takes less than 2 seconds. Findings: No rash. Neurological:      General: No focal deficit present. Mental Status: She is alert and oriented to person, place, and time.       Coordination: Coordination normal.      Gait: Gait normal.   Psychiatric:         Mood and Affect: Mood normal.         Behavior: Behavior normal.

## 2023-12-22 ENCOUNTER — TELEPHONE (OUTPATIENT)
Dept: OBGYN CLINIC | Facility: CLINIC | Age: 43
End: 2023-12-22

## 2024-01-19 ENCOUNTER — TELEPHONE (OUTPATIENT)
Dept: OBGYN CLINIC | Facility: CLINIC | Age: 44
End: 2024-01-19

## 2024-02-21 PROBLEM — Z01.419 ENCOUNTER FOR GYNECOLOGICAL EXAMINATION WITHOUT ABNORMAL FINDING: Status: RESOLVED | Noted: 2019-04-02 | Resolved: 2024-02-21

## 2024-03-05 ENCOUNTER — ANNUAL EXAM (OUTPATIENT)
Dept: OBGYN CLINIC | Facility: CLINIC | Age: 44
End: 2024-03-05
Payer: COMMERCIAL

## 2024-03-05 VITALS
HEIGHT: 62 IN | WEIGHT: 169.6 LBS | DIASTOLIC BLOOD PRESSURE: 84 MMHG | SYSTOLIC BLOOD PRESSURE: 116 MMHG | BODY MASS INDEX: 31.21 KG/M2

## 2024-03-05 DIAGNOSIS — Z01.419 ENCOUNTER FOR ANNUAL ROUTINE GYNECOLOGICAL EXAMINATION: Primary | ICD-10-CM

## 2024-03-05 DIAGNOSIS — Z12.31 ENCOUNTER FOR SCREENING MAMMOGRAM FOR BREAST CANCER: ICD-10-CM

## 2024-03-05 DIAGNOSIS — Z12.4 CERVICAL CANCER SCREENING: ICD-10-CM

## 2024-03-05 DIAGNOSIS — Z30.09 ENCOUNTER FOR GENERAL COUNSELING ON PRESCRIPTION OF ORAL CONTRACEPTIVES: ICD-10-CM

## 2024-03-05 DIAGNOSIS — N92.4 ABNORMAL PERIMENOPAUSAL BLEEDING: ICD-10-CM

## 2024-03-05 DIAGNOSIS — Z12.11 COLON CANCER SCREENING: ICD-10-CM

## 2024-03-05 DIAGNOSIS — Z01.419 ENCOUNTER FOR GYNECOLOGICAL EXAMINATION WITHOUT ABNORMAL FINDING: ICD-10-CM

## 2024-03-05 PROBLEM — J30.9 ALLERGIC RHINITIS: Status: ACTIVE | Noted: 2022-05-23

## 2024-03-05 PROCEDURE — G0145 SCR C/V CYTO,THINLAYER,RESCR: HCPCS | Performed by: STUDENT IN AN ORGANIZED HEALTH CARE EDUCATION/TRAINING PROGRAM

## 2024-03-05 PROCEDURE — S0612 ANNUAL GYNECOLOGICAL EXAMINA: HCPCS | Performed by: OBSTETRICS & GYNECOLOGY

## 2024-03-05 PROCEDURE — G0124 SCREEN C/V THIN LAYER BY MD: HCPCS | Performed by: STUDENT IN AN ORGANIZED HEALTH CARE EDUCATION/TRAINING PROGRAM

## 2024-03-05 PROCEDURE — G0476 HPV COMBO ASSAY CA SCREEN: HCPCS | Performed by: OBSTETRICS & GYNECOLOGY

## 2024-03-05 RX ORDER — NORETHINDRONE ACETATE AND ETHINYL ESTRADIOL, AND FERROUS FUMARATE 1MG-20(24)
1 KIT ORAL DAILY
Qty: 84 TABLET | Refills: 3 | Status: SHIPPED | OUTPATIENT
Start: 2024-03-05

## 2024-03-05 NOTE — ASSESSMENT & PLAN NOTE
Pap/HPV collected  Mammogram ordered  Colonoscopy current 2018     Plans to continue OC for control of menses    Encourage healthy diet, exercise, Calcium 1200mg per day and at least 800 iu Vitamin D daily.

## 2024-03-05 NOTE — PROGRESS NOTES
Assessment/Plan:    Right ovarian cyst  Yearly imaging for follow up.     Encounter for gynecological examination without abnormal finding  Pap/HPV collected  Mammogram ordered  Colonoscopy current 2018     Plans to continue OC for control of menses    Encourage healthy diet, exercise, Calcium 1200mg per day and at least 800 iu Vitamin D daily.    Problem             Intramural and Subserous Leiomyoma of Uterus    There is a submucosal fibroid measuring 8.4 x 6.5 x 6.8 cm,There is a posterior intramural fibroid measuring 2.9 x 2.8 x 1.8 cm and 3.2 x 3.1 x 2 cm.      Right Ovarian Cyst    Complex right ovarian cyst with internal echoes again visualized measuring 4 x 3.2 x 3.7 cm. Prior measurement 5.3 x 3.9 x 4.8 cm.   Plan to repeat imaging yearly        Diagnoses and all orders for this visit:    Encounter for annual routine gynecological examination    Cervical cancer screening  -     Liquid-based pap, screening    Encounter for screening mammogram for breast cancer  -     Mammo screening bilateral w 3d & cad; Future    Colon cancer screening    Encounter for general counseling on prescription of oral contraceptives  -     norethindrone-ethinyl estradiol-ferrous fumarate (Esther 24 Fe) 1-20 MG-MCG(24) per tablet; Take 1 tablet by mouth daily    Abnormal perimenopausal bleeding  -     norethindrone-ethinyl estradiol-ferrous fumarate (Esther 24 Fe) 1-20 MG-MCG(24) per tablet; Take 1 tablet by mouth daily    Encounter for gynecological examination without abnormal finding          Subjective:      Patient ID: Sherri Price is a 44 y.o. female.    Patient presents for a routine annual visit  Menarche- Y/O  Last Pap Smear- 19 neg/neg--collect today  LMP-  Birth control-ocp,  had vasectomy  Mammogram-10/19/23   Colonoscopy-referral not needed. Had with Dr. Gage in 2018 with 10 year recall.     Non smoker  Social drinker  Currently sexually active  Mother, MGM and maternal aunt with endometrial  cancer    No concerns/questions for today's visit    Menses much better with OC use. Plans to continue      Gynecologic Exam  She reports no genital itching, genital lesions, genital odor, genital rash, pelvic pain, vaginal bleeding or vaginal discharge. The patient is experiencing no pain. Pertinent negatives include no chills, constipation, diarrhea, fever, nausea, sore throat or vomiting. The patient's menstrual history has been regular.     The following portions of the patient's history were reviewed and updated as appropriate: She  has a past medical history of Arthritis, Fibroadenoma, Fibroid (2018), Miscarriage (2005), and Psoriasis.  She   Patient Active Problem List    Diagnosis Date Noted    Allergic rhinitis 2022    Encounter for gynecological examination without abnormal finding 2019    Lactose intolerance in adult 2018    Intramural and subserous leiomyoma of uterus 09/10/2018    Right ovarian cyst 09/10/2018    Psoriasis 2009    Psoriatic arthritis (HCC) 2009     She  has a past surgical history that includes  section; US guided breast biopsy right complete (Right, 10/8/2018); pr colonoscopy flx dx w/collj spec when pfrmd (N/A, 10/11/2018); and Breast biopsy (Right, 10/2018).  Her family history includes Cancer in her maternal grandmother and mother; Endometrial cancer in her maternal aunt, maternal grandmother, and mother; Heart disease in her father; Hypertension in her father; No Known Problems in her half-sister, maternal aunt, maternal grandfather, paternal grandfather, paternal grandmother, and sister.  She  reports that she has never smoked. She has never used smokeless tobacco. She reports current alcohol use of about 2.0 standard drinks of alcohol per week. She reports that she does not use drugs.  Current Outpatient Medications   Medication Sig Dispense Refill    halobetasol (ULTRAVATE) 0.05 % ointment APPLY TWICE A DAY TO THE AFFECTED AREAS X 1-2  WEEKS, THEN REDUCE TO THREE TIMES A WEEK (AVOID FACE)      multivitamin (THERAGRAN) TABS 1 tablet daily      norethindrone-ethinyl estradiol-ferrous fumarate (Esther 24 Fe) 1-20 MG-MCG(24) per tablet Take 1 tablet by mouth daily 84 tablet 3    Omega-3 1000 MG CAPS Take 2 g by mouth daily      benzonatate (TESSALON) 200 MG capsule Take 1 capsule (200 mg total) by mouth 3 (three) times a day as needed for cough (Patient not taking: Reported on 3/5/2024) 20 capsule 0    predniSONE 20 mg tablet 2 tabs by mouth daily at once for 4 days. (Patient not taking: Reported on 3/5/2024) 15 tablet 0    pseudoephedrine (SUDAFED) 120 MG 12 hr tablet Take 1 tablet (120 mg total) by mouth 2 (two) times a day (Patient not taking: Reported on 3/5/2024) 10 tablet 0     No current facility-administered medications for this visit.     Current Outpatient Medications on File Prior to Visit   Medication Sig    halobetasol (ULTRAVATE) 0.05 % ointment APPLY TWICE A DAY TO THE AFFECTED AREAS X 1-2 WEEKS, THEN REDUCE TO THREE TIMES A WEEK (AVOID FACE)    multivitamin (THERAGRAN) TABS 1 tablet daily    Omega-3 1000 MG CAPS Take 2 g by mouth daily    [DISCONTINUED] norethindrone-ethinyl estradiol-ferrous fumarate (Esther 24 Fe) 1-20 MG-MCG(24) per tablet Take 1 tablet by mouth daily    benzonatate (TESSALON) 200 MG capsule Take 1 capsule (200 mg total) by mouth 3 (three) times a day as needed for cough (Patient not taking: Reported on 3/5/2024)    predniSONE 20 mg tablet 2 tabs by mouth daily at once for 4 days. (Patient not taking: Reported on 3/5/2024)    pseudoephedrine (SUDAFED) 120 MG 12 hr tablet Take 1 tablet (120 mg total) by mouth 2 (two) times a day (Patient not taking: Reported on 3/5/2024)     No current facility-administered medications on file prior to visit.     She has No Known Allergies..    Review of Systems   Constitutional:  Negative for activity change, appetite change, chills, fatigue and fever.   HENT:  Negative for  "rhinorrhea, sneezing and sore throat.    Eyes:  Negative for visual disturbance.   Respiratory:  Negative for cough, shortness of breath and wheezing.    Cardiovascular:  Negative for chest pain, palpitations and leg swelling.   Gastrointestinal:  Negative for abdominal distention, constipation, diarrhea, nausea and vomiting.   Genitourinary:  Negative for difficulty urinating, pelvic pain and vaginal discharge.   Neurological:  Negative for syncope and light-headedness.         Objective:      /84 (BP Location: Left arm, Patient Position: Sitting, Cuff Size: Standard)   Ht 5' 2\" (1.575 m)   Wt 76.9 kg (169 lb 9.6 oz)   LMP 03/01/2024   BMI 31.02 kg/m²          Physical Exam  Chest:   Breasts:     Breasts are symmetrical.      Right: No inverted nipple, mass, nipple discharge, skin change or tenderness.      Left: No inverted nipple, mass, nipple discharge, skin change or tenderness.   Genitourinary:     Labia:         Right: No rash, tenderness, lesion or injury.         Left: No rash, tenderness, lesion or injury.       Vagina: Normal. No vaginal discharge, erythema, tenderness or bleeding.      Cervix: No cervical motion tenderness, discharge, friability, erythema or cervical bleeding.      Uterus: Enlarged (12 week size, fundal fibroid palpated). Not deviated, not fixed and not tender.       Adnexa:         Right: No mass, tenderness or fullness.          Left: No mass, tenderness or fullness.     Neurological:      Mental Status: She is alert and oriented to person, place, and time.           "

## 2024-03-11 LAB
LAB AP GYN PRIMARY INTERPRETATION: NORMAL
Lab: NORMAL
PATH INTERP SPEC-IMP: NORMAL

## 2024-05-01 PROBLEM — Z01.419 ENCOUNTER FOR GYNECOLOGICAL EXAMINATION WITHOUT ABNORMAL FINDING: Status: RESOLVED | Noted: 2019-04-02 | Resolved: 2024-05-01

## 2024-06-06 DIAGNOSIS — Z00.6 ENCOUNTER FOR EXAMINATION FOR NORMAL COMPARISON OR CONTROL IN CLINICAL RESEARCH PROGRAM: ICD-10-CM

## 2024-07-11 ENCOUNTER — OFFICE VISIT (OUTPATIENT)
Age: 44
End: 2024-07-11
Payer: COMMERCIAL

## 2024-07-11 VITALS
BODY MASS INDEX: 32.42 KG/M2 | SYSTOLIC BLOOD PRESSURE: 110 MMHG | OXYGEN SATURATION: 100 % | RESPIRATION RATE: 18 BRPM | HEART RATE: 64 BPM | DIASTOLIC BLOOD PRESSURE: 80 MMHG | HEIGHT: 62 IN | TEMPERATURE: 97.9 F | WEIGHT: 176.2 LBS

## 2024-07-11 DIAGNOSIS — L40.9 PSORIASIS: ICD-10-CM

## 2024-07-11 DIAGNOSIS — J30.1 SEASONAL ALLERGIC RHINITIS DUE TO POLLEN: ICD-10-CM

## 2024-07-11 DIAGNOSIS — E78.2 MIXED HYPERLIPIDEMIA: ICD-10-CM

## 2024-07-11 DIAGNOSIS — Z00.00 ANNUAL PHYSICAL EXAM: Primary | ICD-10-CM

## 2024-07-11 PROCEDURE — 99386 PREV VISIT NEW AGE 40-64: CPT | Performed by: FAMILY MEDICINE

## 2024-07-11 NOTE — PATIENT INSTRUCTIONS
"Patient Education     Routine physical for adults   The Basics   Written by the doctors and editors at [facility]   What is a physical? -- A physical is a routine visit, or \"check-up,\" with your doctor. You might also hear it called a \"wellness visit\" or \"preventive visit.\"  During each visit, the doctor will:   Ask about your physical and mental health   Ask about your habits, behaviors, and lifestyle   Do an exam   Give you vaccines if needed   Talk to you about any medicines you take   Give advice about your health   Answer your questions  Getting regular check-ups is an important part of taking care of your health. It can help your doctor find and treat any problems you have. But it's also important for preventing health problems.  A routine physical is different from a \"sick visit.\" A sick visit is when you see a doctor because of a health concern or problem. Since physicals are scheduled ahead of time, you can think about what you want to ask the doctor.  How often should I get a physical? -- It depends on your age and health. In general, for people age 21 years and older:   If you are younger than 50 years, you might be able to get a physical every 3 years.   If you are 50 years or older, your doctor might recommend a physical every year.  If you have an ongoing health condition, like diabetes or high blood pressure, your doctor will probably want to see you more often.  What happens during a physical? -- In general, each visit will include:   Physical exam - The doctor or nurse will check your height, weight, heart rate, and blood pressure. They will also look at your eyes and ears. They will ask about how you are feeling and whether you have any symptoms that bother you.   Medicines - It's a good idea to bring a list of all the medicines you take to each doctor visit. Your doctor will talk to you about your medicines and answer any questions. Tell them if you are having any side effects that bother you. You " "should also tell them if you are having trouble paying for any of your medicines.   Habits and behaviors - This includes:   Your diet   Your exercise habits   Whether you smoke, drink alcohol, or use drugs   Whether you are sexually active   Whether you feel safe at home  Your doctor will talk to you about things you can do to improve your health and lower your risk of health problems. They will also offer help and support. For example, if you want to quit smoking, they can give you advice and might prescribe medicines. If you want to improve your diet or get more physical activity, they can help you with this, too.   Lab tests, if needed - The tests you get will depend on your age and situation. For example, your doctor might want to check your:   Cholesterol   Blood sugar   Iron level   Vaccines - The recommended vaccines will depend on your age, health, and what vaccines you already had. Vaccines are very important because they can prevent certain serious or deadly infections.   Discussion of screening - \"Screening\" means checking for diseases or other health problems before they cause symptoms. Your doctor can recommend screening based on your age, risk, and preferences. This might include tests to check for:   Cancer, such as breast, prostate, cervical, ovarian, colorectal, prostate, lung, or skin cancer   Sexually transmitted infections, such as chlamydia and gonorrhea   Mental health conditions like depression and anxiety  Your doctor will talk to you about the different types of screening tests. They can help you decide which screenings to have. They can also explain what the results might mean.   Answering questions - The physical is a good time to ask the doctor or nurse questions about your health. If needed, they can refer you to other doctors or specialists, too.  Adults older than 65 years often need other care, too. As you get older, your doctor will talk to you about:   How to prevent falling at " home   Hearing or vision tests   Memory testing   How to take your medicines safely   Making sure that you have the help and support you need at home  All topics are updated as new evidence becomes available and our peer review process is complete.  This topic retrieved from [employer] on: May 02, 2024.  Topic 406136 Version 1.0  Release: 32.4.3 - C32.122  © 2024 UpToDate, Inc. and/or its affiliates. All rights reserved.  Consumer Information Use and Disclaimer   Disclaimer: This generalized information is a limited summary of diagnosis, treatment, and/or medication information. It is not meant to be comprehensive and should be used as a tool to help the user understand and/or assess potential diagnostic and treatment options. It does NOT include all information about conditions, treatments, medications, side effects, or risks that may apply to a specific patient. It is not intended to be medical advice or a substitute for the medical advice, diagnosis, or treatment of a health care provider based on the health care provider's examination and assessment of a patient's specific and unique circumstances. Patients must speak with a health care provider for complete information about their health, medical questions, and treatment options, including any risks or benefits regarding use of medications. This information does not endorse any treatments or medications as safe, effective, or approved for treating a specific patient. UpToDate, Inc. and its affiliates disclaim any warranty or liability relating to this information or the use thereof.The use of this information is governed by the Terms of Use, available at https://www.woltersRed [employer] Voiceuwer.com/en/know/clinical-effectiveness-terms. 2024© UpToDate, Inc. and its affiliates and/or licensors. All rights reserved.  Copyright   © 2024 UpToDate, Inc. and/or its affiliates. All rights reserved.

## 2024-07-11 NOTE — PROGRESS NOTES
Adult Annual Physical  Name: Sherri Price      : 1980      MRN: [id number]  Encounter Provider: Ifrah Mujica DO  Encounter Date: 2024   Encounter department: [facility] [place]    Assessment & Plan   1. Annual physical exam  -     Comprehensive metabolic panel; Future; Expected date: 2024  -     TSH, 3rd generation with Free T4 reflex; Future; Expected date: 2024  -     CBC and differential; Future; Expected date: 2024  -     Vitamin D 25 hydroxy; Future  -     Ferritin; Future; Expected date: 2024  2. Psoriasis well-managed with topical-steroid therapy.  Follows with dermatology.  3. Seasonal allergic rhinitis due to pollen-controlled with oral antihistamine  4. Mixed hyperlipidemia-managed via diet.  -     Lipid Panel with Direct LDL reflex; Future; Expected date: 2024  Immunizations and preventive care screenings were discussed with patient today. Appropriate education was printed on patient's after visit summary.    Counseling:  Dental Health: discussed importance of regular tooth brushing, flossing, and dental visits.  Exercise: the importance of regular exercise/physical activity was discussed. Recommend exercise 3-5 times per week for at least 30 minutes.          History of Present Illness     Adult Annual Physical:  Patient presents for annual physical. Pt presents to establish care   No chronic medical hx  Family hx of melnoma and endometrial cancer   Works full time energineer for [employer]   Never smsoker   ETOH- 1 drink nearly daily .     Diet and Physical Activity:  - Diet/Nutrition: well balanced diet.  - Exercise: no formal exercise.    Depression Screening:  - PHQ-2 Score: 0    General Health:  - Sleep: 7-8 hours of sleep on average.  - Hearing: normal hearing bilateral ears.  - Vision: wears contacts.  - Dental: regular dental visits.    /GYN Health:  - Follows with GYN: yes.   - Menopause: perimenopausal.   - Contraception:  "oral contraceptives.      Review of Systems   Respiratory:  Negative for shortness of breath.    Cardiovascular:  Negative for chest pain and palpitations.   Gastrointestinal:  Negative for constipation and diarrhea.   Musculoskeletal:  Negative for arthralgias and gait problem.   Allergic/Immunologic: Positive for environmental allergies.   Neurological:  Negative for headaches.         Objective     /80 (BP Location: Left arm, Patient Position: Sitting, Cuff Size: Standard)   Pulse 64   Temp 97.9 °F (36.6 °C) (Tympanic)   Resp 18   Ht 5' 2\" (1.575 m)   Wt 79.9 kg (176 lb 3.2 oz)   SpO2 100%   BMI 32.23 kg/m²     Physical Exam  Vitals and nursing note reviewed.   Constitutional:       General: She is not in acute distress.     Appearance: She is well-developed.   HENT:      Head: Normocephalic and atraumatic.      Right Ear: Tympanic membrane and external ear normal.      Left Ear: Tympanic membrane and external ear normal.      Mouth/Throat:      Mouth: Mucous membranes are moist.      Pharynx: Oropharynx is clear.   Eyes:      Extraocular Movements: Extraocular movements intact.      Conjunctiva/sclera: Conjunctivae normal.      Pupils: Pupils are equal, round, and reactive to light.   Cardiovascular:      Rate and Rhythm: Normal rate and regular rhythm.      Heart sounds: No murmur heard.  Pulmonary:      Effort: Pulmonary effort is normal. No respiratory distress.      Breath sounds: Normal breath sounds.   Abdominal:      Palpations: Abdomen is soft.      Tenderness: There is no abdominal tenderness.   Musculoskeletal:         General: No swelling.      Cervical back: Neck supple.   Skin:     General: Skin is warm and dry.      Capillary Refill: Capillary refill takes less than 2 seconds.   Neurological:      Mental Status: She is alert and oriented to person, place, and time.      Gait: Gait normal.   Psychiatric:         Mood and Affect: Mood normal.         Behavior: Behavior normal.         "

## 2024-07-12 ENCOUNTER — TELEPHONE (OUTPATIENT)
Dept: ADMINISTRATIVE | Facility: OTHER | Age: 44
End: 2024-07-12

## 2024-07-12 NOTE — TELEPHONE ENCOUNTER
----- Message from Raine MOORE sent at 7/11/2024  3:04 PM EDT -----  Regarding: Mole biopsy and then Mole Removal  07/11/24 3:05 PM    Hello, our patient Sherri Price has had Mole biopsy and then Mole Removal completed/performed. Please assist in updating the patient chart by making an External outreach to [facility] located in [place]. The date of service is 01/2024.    Thank you,  Raine Concepcion [facility] [place]

## 2024-07-12 NOTE — TELEPHONE ENCOUNTER
Upon review of the In Basket request we have noted this is a NON VALUE BASED item. We are unable to complete this request. Our team has the capability to assist in retrieval, updating, and linking of the following items: Chlamydia, CRC Cologuard, CRC Colonoscopy, CRC CT Colonography, CRC FIT/FOBT(3), CRC Sigmoidoscopy, CT Lung Screening, DEXA Scan, Diabetic Eye Exam, Diabetic Foot Exam, Hemoglobin A1c, Hemoglobin (pediatrics), Hepatitis C, HIV (cannot retrieve), Immunizations, Lead, Mammogram, Medicare AWV, Pap Smear (HPV),  and Urine Microalbumin.    Any additional questions or concerns should be emailed to the Practice Liaisons via the appropriate education email address, please do not reply via In Basket.    Thank you  Dina Jane MA   [facility]

## 2024-07-17 ENCOUNTER — APPOINTMENT (OUTPATIENT)
Age: 44
End: 2024-07-17
Payer: COMMERCIAL

## 2024-07-17 DIAGNOSIS — Z00.6 ENCOUNTER FOR EXAMINATION FOR NORMAL COMPARISON OR CONTROL IN CLINICAL RESEARCH PROGRAM: ICD-10-CM

## 2024-07-17 DIAGNOSIS — E78.2 MIXED HYPERLIPIDEMIA: ICD-10-CM

## 2024-07-17 DIAGNOSIS — Z00.00 ANNUAL PHYSICAL EXAM: ICD-10-CM

## 2024-07-17 LAB
25(OH)D3 SERPL-MCNC: 54.6 NG/ML (ref 30–100)
ALBUMIN SERPL BCG-MCNC: 3.6 G/DL (ref 3.5–5)
ALP SERPL-CCNC: 32 U/L (ref 34–104)
ALT SERPL W P-5'-P-CCNC: 17 U/L (ref 7–52)
ANION GAP SERPL CALCULATED.3IONS-SCNC: 11 MMOL/L (ref 4–13)
AST SERPL W P-5'-P-CCNC: 15 U/L (ref 13–39)
BASOPHILS # BLD AUTO: 0.05 THOUSANDS/ÂΜL (ref 0–0.1)
BASOPHILS NFR BLD AUTO: 1 % (ref 0–1)
BILIRUB SERPL-MCNC: 0.39 MG/DL (ref 0.2–1)
BUN SERPL-MCNC: 10 MG/DL (ref 5–25)
CALCIUM SERPL-MCNC: 8.8 MG/DL (ref 8.4–10.2)
CHLORIDE SERPL-SCNC: 106 MMOL/L (ref 96–108)
CHOLEST SERPL-MCNC: 187 MG/DL
CO2 SERPL-SCNC: 23 MMOL/L (ref 21–32)
CREAT SERPL-MCNC: 0.75 MG/DL (ref 0.6–1.3)
EOSINOPHIL # BLD AUTO: 0.26 THOUSAND/ÂΜL (ref 0–0.61)
EOSINOPHIL NFR BLD AUTO: 5 % (ref 0–6)
ERYTHROCYTE [DISTWIDTH] IN BLOOD BY AUTOMATED COUNT: 12.8 % (ref 11.6–15.1)
FERRITIN SERPL-MCNC: 26 NG/ML (ref 11–307)
GFR SERPL CREATININE-BSD FRML MDRD: 96 ML/MIN/1.73SQ M
GLUCOSE P FAST SERPL-MCNC: 82 MG/DL (ref 65–99)
HCT VFR BLD AUTO: 37.5 % (ref 34.8–46.1)
HDLC SERPL-MCNC: 62 MG/DL
HGB BLD-MCNC: 12.6 G/DL (ref 11.5–15.4)
IMM GRANULOCYTES # BLD AUTO: 0.01 THOUSAND/UL (ref 0–0.2)
IMM GRANULOCYTES NFR BLD AUTO: 0 % (ref 0–2)
LDLC SERPL CALC-MCNC: 101 MG/DL (ref 0–100)
LYMPHOCYTES # BLD AUTO: 2.08 THOUSANDS/ÂΜL (ref 0.6–4.47)
LYMPHOCYTES NFR BLD AUTO: 37 % (ref 14–44)
MCH RBC QN AUTO: 32.1 PG (ref 26.8–34.3)
MCHC RBC AUTO-ENTMCNC: 33.6 G/DL (ref 31.4–37.4)
MCV RBC AUTO: 95 FL (ref 82–98)
MONOCYTES # BLD AUTO: 0.37 THOUSAND/ÂΜL (ref 0.17–1.22)
MONOCYTES NFR BLD AUTO: 7 % (ref 4–12)
NEUTROPHILS # BLD AUTO: 2.81 THOUSANDS/ÂΜL (ref 1.85–7.62)
NEUTS SEG NFR BLD AUTO: 50 % (ref 43–75)
NRBC BLD AUTO-RTO: 0 /100 WBCS
PLATELET # BLD AUTO: 324 THOUSANDS/UL (ref 149–390)
PMV BLD AUTO: 11.3 FL (ref 8.9–12.7)
POTASSIUM SERPL-SCNC: 4 MMOL/L (ref 3.5–5.3)
PROT SERPL-MCNC: 6.2 G/DL (ref 6.4–8.4)
RBC # BLD AUTO: 3.93 MILLION/UL (ref 3.81–5.12)
SODIUM SERPL-SCNC: 140 MMOL/L (ref 135–147)
TRIGL SERPL-MCNC: 118 MG/DL
TSH SERPL DL<=0.05 MIU/L-ACNC: 2.7 UIU/ML (ref 0.45–4.5)
WBC # BLD AUTO: 5.58 THOUSAND/UL (ref 4.31–10.16)

## 2024-07-17 PROCEDURE — 36415 COLL VENOUS BLD VENIPUNCTURE: CPT

## 2024-07-17 PROCEDURE — 82728 ASSAY OF FERRITIN: CPT

## 2024-07-17 PROCEDURE — 82306 VITAMIN D 25 HYDROXY: CPT

## 2024-07-17 PROCEDURE — 85025 COMPLETE CBC W/AUTO DIFF WBC: CPT

## 2024-07-17 PROCEDURE — 80061 LIPID PANEL: CPT

## 2024-07-17 PROCEDURE — 80053 COMPREHEN METABOLIC PANEL: CPT

## 2024-07-17 PROCEDURE — 84443 ASSAY THYROID STIM HORMONE: CPT

## 2024-07-18 ENCOUNTER — TELEPHONE (OUTPATIENT)
Age: 44
End: 2024-07-18

## 2024-07-18 NOTE — TELEPHONE ENCOUNTER
----- Message from Ifrah Mujica DO sent at 7/18/2024  3:28 PM EDT -----  Please notify patient that overall her blood work looks great except for there is evidence of low protein intake.  She should work on trying to increase dietary protein to help with this issue

## 2024-10-01 LAB
APOB+LDLR+PCSK9 GENE MUT ANL BLD/T: NOT DETECTED
BRCA1+BRCA2 DEL+DUP + FULL MUT ANL BLD/T: NOT DETECTED
MLH1+MSH2+MSH6+PMS2 GN DEL+DUP+FUL M: NOT DETECTED

## 2024-10-22 ENCOUNTER — HOSPITAL ENCOUNTER (OUTPATIENT)
Dept: MAMMOGRAPHY | Facility: CLINIC | Age: 44
Discharge: HOME | End: 2024-10-22
Payer: COMMERCIAL

## 2024-10-22 DIAGNOSIS — Z12.31 ENCOUNTER FOR SCREENING MAMMOGRAM FOR BREAST CANCER: ICD-10-CM

## 2024-10-22 PROCEDURE — 77063 BREAST TOMOSYNTHESIS BI: CPT

## 2024-10-22 PROCEDURE — 77067 SCR MAMMO BI INCL CAD: CPT

## 2024-12-16 ENCOUNTER — HOSPITAL ENCOUNTER (OUTPATIENT)
Dept: ULTRASOUND IMAGING | Facility: CLINIC | Age: 44
Discharge: HOME | End: 2024-12-16
Payer: COMMERCIAL

## 2024-12-16 DIAGNOSIS — N83.201 CYST OF RIGHT OVARY: ICD-10-CM

## 2024-12-16 PROCEDURE — 76856 US EXAM PELVIC COMPLETE: CPT

## 2024-12-16 PROCEDURE — 76830 TRANSVAGINAL US NON-OB: CPT

## 2024-12-23 ENCOUNTER — RESULTS FOLLOW-UP (OUTPATIENT)
Dept: OBGYN CLINIC | Facility: CLINIC | Age: 44
End: 2024-12-23

## 2025-03-11 ENCOUNTER — ANNUAL EXAM (OUTPATIENT)
Dept: OBGYN CLINIC | Facility: CLINIC | Age: 45
End: 2025-03-11
Payer: COMMERCIAL

## 2025-03-11 VITALS — WEIGHT: 182.2 LBS | SYSTOLIC BLOOD PRESSURE: 124 MMHG | BODY MASS INDEX: 33.32 KG/M2 | DIASTOLIC BLOOD PRESSURE: 86 MMHG

## 2025-03-11 DIAGNOSIS — D25.2 INTRAMURAL AND SUBSEROUS LEIOMYOMA OF UTERUS: ICD-10-CM

## 2025-03-11 DIAGNOSIS — R63.5 WEIGHT GAIN, ABNORMAL: ICD-10-CM

## 2025-03-11 DIAGNOSIS — Z30.09 ENCOUNTER FOR GENERAL COUNSELING ON PRESCRIPTION OF ORAL CONTRACEPTIVES: ICD-10-CM

## 2025-03-11 DIAGNOSIS — N83.201 RIGHT OVARIAN CYST: ICD-10-CM

## 2025-03-11 DIAGNOSIS — D25.1 INTRAMURAL AND SUBSEROUS LEIOMYOMA OF UTERUS: ICD-10-CM

## 2025-03-11 DIAGNOSIS — Z01.419 ENCOUNTER FOR GYNECOLOGICAL EXAMINATION: Primary | ICD-10-CM

## 2025-03-11 PROCEDURE — S0612 ANNUAL GYNECOLOGICAL EXAMINA: HCPCS | Performed by: OBSTETRICS & GYNECOLOGY

## 2025-03-11 RX ORDER — ACETAMINOPHEN AND CODEINE PHOSPHATE 120; 12 MG/5ML; MG/5ML
1 SOLUTION ORAL DAILY
Qty: 84 TABLET | Refills: 3 | Status: SHIPPED | OUTPATIENT
Start: 2025-03-11

## 2025-03-11 RX ORDER — METRONIDAZOLE 10 MG/G
GEL TOPICAL
COMMUNITY
Start: 2025-02-20

## 2025-03-11 NOTE — PROGRESS NOTES
Name: Sherri Price      : 1980      MRN: [id number]  Encounter Provider: Yee Adams MD  Encounter Date: 3/11/2025   Encounter department: [facility] [place]  :  Assessment & Plan  Encounter for gynecological examination  Pap/HPV current  Mammogram ordered  Colonoscopy current    H/O ovarian cyst/fibroid -follow up yearly ultrasound. OC has been controlling menses. Concern for inability to lose weight. Will change pill to progestin only to see if this helps.  Weight management consult placed.        Encounter for general counseling on prescription of oral contraceptives    Orders:    norethindrone (MICRONOR) 0.35 MG tablet; Take 1 tablet (0.35 mg total) by mouth daily    Right ovarian cyst    Orders:    US pelvis complete w transvaginal; Future    Intramural and subserous leiomyoma of uterus    Orders:    US pelvis complete w transvaginal; Future    BMI 33.0-33.9,adult    Orders:    Ambulatory Referral to Weight Management; Future    Weight gain, abnormal    Orders:    Ambulatory Referral to Weight Management; Future        History of Present Illness   Difficult time losing weight. Has been eating healthy, calorie counting. Walking daily no change.    Menses controlled on OC.  Light bleeding on period weeks    Gynecologic Exam  She reports no genital itching, genital lesions, genital odor, genital rash, pelvic pain, vaginal bleeding or vaginal discharge. The patient is experiencing no pain. Pertinent negatives include no chills, constipation, diarrhea, fever, nausea, sore throat or vomiting.     Sherri Price is a 45 y.o. female who presents  for a routine annual visit    Menarche-  Last Pap Smear- 3/5/24 neg/neg  LMP-25  Birth control-ocp and vasectomy  Mammogram-10/22/24  Colonoscopy-  Non smoker  Social drinker    Currently sexually active  Mother, MGM, maternal aunt--endometrial cancer    Changes with periods lately. Some months only  spotting, others are heavier. Also mentions hard time losing weight. Would like to discuss HRT.         Review of Systems   Constitutional:  Negative for activity change, appetite change, chills, fatigue and fever.   HENT:  Negative for rhinorrhea, sneezing and sore throat.    Eyes:  Negative for visual disturbance.   Respiratory:  Negative for cough, shortness of breath and wheezing.    Cardiovascular:  Negative for chest pain, palpitations and leg swelling.   Gastrointestinal:  Negative for abdominal distention, constipation, diarrhea, nausea and vomiting.   Genitourinary:  Negative for difficulty urinating, pelvic pain and vaginal discharge.   Neurological:  Negative for syncope and light-headedness.          Objective   There were no vitals taken for this visit.     Physical Exam  Chest:   Breasts:     Breasts are symmetrical.      Right: No inverted nipple, mass, nipple discharge, skin change or tenderness.      Left: No inverted nipple, mass, nipple discharge, skin change or tenderness.   Genitourinary:     Labia:         Right: No rash, tenderness, lesion or injury.         Left: No rash, tenderness, lesion or injury.       Vagina: Normal. No vaginal discharge, erythema, tenderness or bleeding.      Cervix: No cervical motion tenderness, discharge, friability, erythema or cervical bleeding. Eversion: deviated to the left.     Uterus: Deviated and fixed. Not enlarged and not tender.       Adnexa:         Right: Tenderness and fullness present. No mass.          Left: Tenderness and fullness present. No mass.     Neurological:      Mental Status: She is alert and oriented to person, place, and time.

## 2025-03-18 ENCOUNTER — CLINICAL SUPPORT (OUTPATIENT)
Dept: BARIATRICS | Facility: CLINIC | Age: 45
End: 2025-03-18

## 2025-03-18 DIAGNOSIS — R63.5 WEIGHT GAIN, ABNORMAL: ICD-10-CM

## 2025-03-18 PROCEDURE — WMDI30

## 2025-03-18 PROCEDURE — RECHECK

## 2025-03-18 NOTE — PROGRESS NOTES
Weight Management Medical Nutrition Assessment  Sherri was here today for medical meal planning.  Today she weighs 182 lbs and has a goal to weigh 150 lbs.  She is not interested in seeing a provider or discussing medication at this time.  Reviewed her calorie, carb and protein needs.  Recommend that she start logging her food.  She was using the fit bit philip but has gotten away from it.  She is willing to restart.  She is currently walking several times a week and plans to increase with the nicer weather and time change.  Recommend that she add in strength training 2 - 3 times a week.  Discussed hydration goals as well.     Patient seen by Medical Provider in past 6 months:  yes  Requested to schedule appointment with Medical Provider: No      Anthropometric Measurements  Start Weight (#) & Date: 182.0    3/18/2025  Current Weight (#): 182  TBW % Change from start weight:0  Ideal Body Weight (#):110  Goal Weight (#):150  Highest:current  Lowest:120    Weight Loss History  Previous weight loss attempts: Commercial Programs (Weight Watchers, Wesabe, etc.)  High Protein/Low CHO diets (Atkins, Bryant, etc.)  Meal Replacements (Medifast, Slim Fast, etc.)    Food and Nutrition Related History  Wake up: 5:30 am   Bed Time:9 - 10 pm    Food Recall  Breakfast:egg whites or beaters and laughing cow cheese and salsa    Snack:none or will have yogurt  Lunch:soup or salad or turkey chili  Snack:yogurt or rice with PB  Dinner:protein, veg and starch  Snack:none      Beverages: water, coffee  Volume of beverage intake: 40 oz    Weekends: Worse, not on a schedule and home eating more  Cravings: carbs  Trouble area of day:evenings    Frequency of Eating out: irregularly  Food restrictions:none  Cooking: self   Food Shopping: self    Physical Activity Intake  Activity:Walking  Frequency:frequently 5 - 6 times week  Physical limitations/barriers to exercise: none    Estimated Needs  Energy    Melissa Isaac Energy Needs:  BMR : 1419   1-2# loss weekly sedentary:  703 - 1203             1-2# loss weekly lightly active:951 - 1451  Maintenance calories for sedentary activity level:  1703  Protein:60 - 75      (1.2-1.5g/kg IBW)  Fluid Requirement Calculator   Total Fluid: 93   oz  (Quincy-Segar Method)  Free Fluid: 18 oz (Quincy-Segar Method - 20%)    Nutrition Diagnosis  Yes;    Overweight/obesity  related to Excess energy intake as evidenced by  BMI more than normative standard for age and sex (obesity-grade I 30-34.9)       Nutrition Intervention    Nutrition Prescription  Calories:1200 - 1400  Protein:60 - 75  Fluid:74    Meal Plan (Esau/Pro/Carb)  Breakfast: 300/12/30  Snack:  Lunch:300/20/30  Snack: 150/10/15  Dinner:400/28/30  Snack:150/10/15    Nutrition Education:    Healthy Core Manual  Calorie controlled menu  Lean protein food choices  Healthy snack options  Food journaling tips      Nutrition Counseling:  Strategies: meal planning, portion sizes, healthy snack choices, hydration, fiber intake, protein intake, exercise, food journal      Monitoring and Evaluation:  Evaluation criteria:  Energy Intake  Meet protein needs  Maintain adequate hydration  Monitor weekly weight  Meal planning/preparation  Food journal   Decreased portions at mealtimes and snacks  Physical activity     Barriers to learning:none  Readiness to change: Action:  (Changing behavior)  Comprehension: good  Expected Compliance: good

## 2025-04-29 ENCOUNTER — CLINICAL SUPPORT (OUTPATIENT)
Dept: BARIATRICS | Facility: CLINIC | Age: 45
End: 2025-04-29

## 2025-04-29 DIAGNOSIS — R63.5 ABNORMAL WEIGHT GAIN: Primary | ICD-10-CM

## 2025-04-29 PROCEDURE — RECHECK

## 2025-04-29 NOTE — PROGRESS NOTES
"Weight Management Medical Nutrition Assessment  Sherri was here today for medical meal planning.  Today she weighs 178.6 lbs giving her a loss of 3.4 lbs since her last visit in March.  She started off logging and reports she did well and lost about 6 lbs but did \"get comfortable\" and stopped logging a few weeks ago and gained a few pounds back, so recently started logging again.  She admits she does better when logging and focusing more on portion sizes.  She has been walking several times a week and plans to add in some weights and strength training as well.  .      Patient seen by Medical Provider in past 6 months:  yes  Requested to schedule appointment with Medical Provider: No      Anthropometric Measurements  Start Weight (#) & Date: 182.0    3/18/2025  Current Weight (#): 178.6  TBW % Change from start weight:2%  Ideal Body Weight (#):110  Goal Weight (#):150  Highest:current  Lowest:120    Weight Loss History  Previous weight loss attempts: Commercial Programs (Weight Watchers, Marcandi, etc.)  High Protein/Low CHO diets (Atkins, [employer], etc.)  Meal Replacements (Medifast, Slim Fast, etc.)    Food and Nutrition Related History  Wake up: 5:30 am   Bed Time:9 - 10 pm    Food Recall  Breakfast:egg whites or beaters and laughing cow cheese and salsa    Snack:none or will have yogurt  Lunch:soup or salad or turkey chili  Snack:yogurt or rice cake with PB  Dinner:protein, veg and starch  Snack:none      Beverages: water, coffee  Volume of beverage intake: has increased to 60 oz    Weekends: Worse, not on a schedule and home eating more  Cravings: carbs  Trouble area of day:evenings    Frequency of Eating out: irregularly  Food restrictions:none  Cooking: self   Food Shopping: self    Physical Activity Intake  Activity:Walking  Frequency:frequently 5 - 6 times week  Physical limitations/barriers to exercise: none    Estimated Needs  Energy    Melissa [address] Wright Energy Needs: BMR : 1403   1# loss weekly " sedentary: 1184           1-2# loss weekly lightly active:930 - 1430  Maintenance calories for sedentary activity level:  1684  Protein:60 - 75      (1.2-1.5g/kg IBW)  Fluid Requirement Calculator   Total Fluid: 91.98  oz  (Anibal-Segar Method)  Free Fluid:73.58  oz (Andrews-Segar Method - 20%)    Nutrition Diagnosis  Yes;    Overweight/obesity  related to Excess energy intake as evidenced by  BMI more than normative standard for age and sex (obesity-grade I 30-34.9)       Nutrition Intervention    Nutrition Prescription  Calories:1200 - 1400  Protein:60 - 75  Fluid:74    Meal Plan (Esau/Pro/Carb)  Breakfast: 300/12/30  Snack:  Lunch:300/20/30  Snack: 150/10/15  Dinner:400/28/30  Snack:150/10/15    Nutrition Education:    Healthy Core Manual  Calorie controlled menu  Lean protein food choices  Healthy snack options  Food journaling tips      Nutrition Counseling:  Strategies: meal planning, portion sizes, healthy snack choices, hydration, fiber intake, protein intake, exercise, food journal      Monitoring and Evaluation:  Evaluation criteria:  Energy Intake  Meet protein needs  Maintain adequate hydration  Monitor weekly weight  Meal planning/preparation  Food journal   Decreased portions at mealtimes and snacks  Physical activity     Barriers to learning:none  Readiness to change: Action:  (Changing behavior)  Comprehension: good  Expected Compliance: good

## 2025-04-30 VITALS — HEIGHT: 62 IN | BODY MASS INDEX: 32.87 KG/M2 | WEIGHT: 178.6 LBS

## 2025-08-19 ENCOUNTER — TELEMEDICINE (OUTPATIENT)
Dept: OTHER | Facility: HOSPITAL | Age: 45
End: 2025-08-19
Payer: COMMERCIAL

## 2025-08-19 DIAGNOSIS — N30.00 ACUTE CYSTITIS WITHOUT HEMATURIA: Primary | ICD-10-CM

## 2025-08-19 PROCEDURE — 99213 OFFICE O/P EST LOW 20 MIN: CPT | Performed by: PHYSICIAN ASSISTANT

## 2025-08-19 RX ORDER — NITROFURANTOIN 25; 75 MG/1; MG/1
100 CAPSULE ORAL 2 TIMES DAILY
Qty: 14 CAPSULE | Refills: 0 | Status: SHIPPED | OUTPATIENT
Start: 2025-08-19 | End: 2025-08-26